# Patient Record
Sex: FEMALE | Race: WHITE | Employment: OTHER | URBAN - METROPOLITAN AREA
[De-identification: names, ages, dates, MRNs, and addresses within clinical notes are randomized per-mention and may not be internally consistent; named-entity substitution may affect disease eponyms.]

---

## 2017-03-17 ENCOUNTER — HOSPITAL ENCOUNTER (OUTPATIENT)
Dept: SLEEP CENTER | Age: 72
Discharge: HOME | End: 2017-03-17
Attending: FAMILY MEDICINE | Admitting: FAMILY MEDICINE

## 2017-04-14 ENCOUNTER — HOSPITAL ENCOUNTER (OUTPATIENT)
Dept: SLEEP CENTER | Age: 72
Discharge: HOME | End: 2017-04-14
Attending: INTERNAL MEDICINE | Admitting: INTERNAL MEDICINE

## 2017-04-28 ENCOUNTER — HOSPITAL ENCOUNTER (OUTPATIENT)
Dept: SLEEP CENTER | Age: 72
Discharge: HOME | End: 2017-04-28
Attending: INTERNAL MEDICINE | Admitting: INTERNAL MEDICINE

## 2017-07-05 LAB
AVERAGE GLUCOSE: NORMAL
HBA1C MFR BLD: 8.9 %

## 2017-07-21 ENCOUNTER — HOSPITAL ENCOUNTER (OUTPATIENT)
Dept: SLEEP CENTER | Age: 72
Discharge: HOME | End: 2017-07-21
Attending: INTERNAL MEDICINE | Admitting: INTERNAL MEDICINE

## 2017-11-22 ENCOUNTER — HOSPITAL ENCOUNTER (OUTPATIENT)
Dept: OTHER | Age: 72
Discharge: HOME | End: 2017-11-22
Attending: INTERNAL MEDICINE | Admitting: INTERNAL MEDICINE

## 2017-11-22 LAB
ALBUMIN SERPL-MCNC: 4.7 GM/DL (ref 3.4–5)
ALP BLD-CCNC: 72 IU/L (ref 40–129)
ALT SERPL-CCNC: 54 U/L (ref 10–40)
ANION GAP SERPL CALCULATED.3IONS-SCNC: 14 MMOL/L (ref 4–16)
AST SERPL-CCNC: 55 IU/L (ref 15–37)
BILIRUB SERPL-MCNC: 0.5 MG/DL (ref 0–1)
BUN BLDV-MCNC: 10 MG/DL (ref 6–23)
CALCIUM SERPL-MCNC: 9.6 MG/DL (ref 8.3–10.6)
CHLORIDE BLD-SCNC: 94 MMOL/L (ref 99–110)
CO2: 28 MMOL/L (ref 21–32)
CREAT SERPL-MCNC: 0.5 MG/DL (ref 0.6–1.1)
FERRITIN: 427 NG/ML (ref 15–150)
GFR AFRICAN AMERICAN: >60 ML/MIN/1.73M2
GFR NON-AFRICAN AMERICAN: >60 ML/MIN/1.73M2
GLUCOSE BLD-MCNC: 243 MG/DL (ref 70–99)
IRON: 52 UG/DL (ref 37–145)
PCT TRANSFERRIN: 15 % (ref 10–44)
POTASSIUM SERPL-SCNC: 3.4 MMOL/L (ref 3.5–5.1)
RETICULOCYTE COUNT PCT: 1.5 % (ref 0.2–2.2)
SODIUM BLD-SCNC: 136 MMOL/L (ref 135–145)
TOTAL IRON BINDING CAPACITY: 357 UG/DL (ref 250–450)
TOTAL PROTEIN: 7.4 GM/DL (ref 6.4–8.2)
UNSATURATED IRON BINDING CAPACITY: 305 UG/DL (ref 110–370)

## 2018-03-23 ENCOUNTER — HOSPITAL ENCOUNTER (OUTPATIENT)
Dept: OTHER | Age: 73
Discharge: HOME | End: 2018-03-23
Attending: INTERNAL MEDICINE | Admitting: INTERNAL MEDICINE

## 2018-03-23 LAB
ALBUMIN SERPL-MCNC: 4.5 GM/DL (ref 3.4–5)
ALP BLD-CCNC: 60 IU/L (ref 40–129)
ALT SERPL-CCNC: 42 U/L (ref 10–40)
ANION GAP SERPL CALCULATED.3IONS-SCNC: 14 MMOL/L (ref 4–16)
APTT: 42.9 SECONDS (ref 21.2–33)
AST SERPL-CCNC: 51 IU/L (ref 15–37)
BILIRUB SERPL-MCNC: 0.3 MG/DL (ref 0–1)
BUN BLDV-MCNC: 13 MG/DL (ref 6–23)
CALCIUM SERPL-MCNC: 10 MG/DL (ref 8.3–10.6)
CHLORIDE BLD-SCNC: 95 MMOL/L (ref 99–110)
CO2: 31 MMOL/L (ref 21–32)
CREAT SERPL-MCNC: 0.5 MG/DL (ref 0.6–1.1)
FERRITIN: 140 NG/ML (ref 15–150)
FOLATE: >20 NG/ML (ref 3.1–17.5)
GFR AFRICAN AMERICAN: >60 ML/MIN/1.73M2
GFR NON-AFRICAN AMERICAN: >60 ML/MIN/1.73M2
GLUCOSE BLD-MCNC: 222 MG/DL (ref 70–99)
INR BLD: 1.19 INDEX
IRON: 53 UG/DL (ref 37–145)
LACTATE DEHYDROGENASE: 162 IU/L (ref 120–246)
PCT TRANSFERRIN: 14 % (ref 10–44)
POTASSIUM SERPL-SCNC: 3.2 MMOL/L (ref 3.5–5.1)
PROTHROMBIN TIME: 13.5 SECONDS (ref 9.12–12.5)
SODIUM BLD-SCNC: 140 MMOL/L (ref 135–145)
TOTAL IRON BINDING CAPACITY: 373 UG/DL (ref 250–450)
TOTAL PROTEIN: 7.1 GM/DL (ref 6.4–8.2)
UNSATURATED IRON BINDING CAPACITY: 320 UG/DL (ref 110–370)
VITAMIN B-12: 563.5 PG/ML (ref 211–911)

## 2018-07-23 ENCOUNTER — HOSPITAL ENCOUNTER (OUTPATIENT)
Dept: OTHER | Age: 73
Discharge: HOME | End: 2018-07-23
Attending: INTERNAL MEDICINE | Admitting: INTERNAL MEDICINE

## 2018-07-23 LAB
ALBUMIN SERPL-MCNC: 4.3 GM/DL (ref 3.4–5)
ALP BLD-CCNC: 73 IU/L (ref 40–129)
ALT SERPL-CCNC: 40 U/L (ref 10–40)
ANION GAP SERPL CALCULATED.3IONS-SCNC: 16 MMOL/L (ref 4–16)
AST SERPL-CCNC: 33 IU/L (ref 15–37)
BILIRUB SERPL-MCNC: 0.7 MG/DL (ref 0–1)
BUN BLDV-MCNC: 10 MG/DL (ref 6–23)
CALCIUM SERPL-MCNC: 9.4 MG/DL (ref 8.3–10.6)
CHLORIDE BLD-SCNC: 100 MMOL/L (ref 99–110)
CO2: 24 MMOL/L (ref 21–32)
CREAT SERPL-MCNC: 0.6 MG/DL (ref 0.6–1.1)
FERRITIN: 87 NG/ML (ref 15–150)
GFR AFRICAN AMERICAN: >60 ML/MIN/1.73M2
GFR NON-AFRICAN AMERICAN: >60 ML/MIN/1.73M2
GLUCOSE BLD-MCNC: 234 MG/DL (ref 70–99)
POTASSIUM SERPL-SCNC: 3.4 MMOL/L (ref 3.5–5.1)
SODIUM BLD-SCNC: 140 MMOL/L (ref 135–145)
TOTAL PROTEIN: 6.9 GM/DL (ref 6.4–8.2)
VITAMIN B-12: 539.6 PG/ML (ref 211–911)

## 2018-10-29 ENCOUNTER — HOSPITAL ENCOUNTER (OUTPATIENT)
Age: 73
Setting detail: SPECIMEN
Discharge: HOME | End: 2018-10-29
Payer: COMMERCIAL

## 2018-10-29 LAB
ALBUMIN SERPL-MCNC: 4.3 GM/DL (ref 3.4–5)
ALP BLD-CCNC: 70 IU/L (ref 40–129)
ALT SERPL-CCNC: 51 U/L (ref 10–40)
ANION GAP SERPL CALCULATED.3IONS-SCNC: 16 MMOL/L (ref 4–16)
APTT: 46.2 SECONDS (ref 21.2–33)
AST SERPL-CCNC: 56 IU/L (ref 15–37)
BILIRUB SERPL-MCNC: 0.4 MG/DL (ref 0–1)
BUN BLDV-MCNC: 9 MG/DL (ref 6–23)
CALCIUM SERPL-MCNC: 10.5 MG/DL (ref 8.3–10.6)
CHLORIDE BLD-SCNC: 96 MMOL/L (ref 99–110)
CO2: 27 MMOL/L (ref 21–32)
CREAT SERPL-MCNC: 0.5 MG/DL (ref 0.6–1.1)
FERRITIN: 68 NG/ML (ref 15–150)
FOLATE: >20 NG/ML (ref 3.1–17.5)
GFR AFRICAN AMERICAN: >60 ML/MIN/1.73M2
GFR NON-AFRICAN AMERICAN: >60 ML/MIN/1.73M2
GLUCOSE BLD-MCNC: 155 MG/DL (ref 70–99)
INR BLD: 1.13 INDEX
IRON: 60 UG/DL (ref 37–145)
LACTATE DEHYDROGENASE: 132 IU/L (ref 120–246)
PCT TRANSFERRIN: 15 % (ref 10–44)
POTASSIUM SERPL-SCNC: 3.6 MMOL/L (ref 3.5–5.1)
PROTHROMBIN TIME: 12.9 SECONDS (ref 9.12–12.5)
RETICULOCYTE COUNT PCT: 1.7 % (ref 0.2–2.2)
SODIUM BLD-SCNC: 139 MMOL/L (ref 135–145)
TOTAL IRON BINDING CAPACITY: 393 UG/DL (ref 250–450)
TOTAL PROTEIN: 7.3 GM/DL (ref 6.4–8.2)
UNSATURATED IRON BINDING CAPACITY: 333 UG/DL (ref 110–370)
VITAMIN B-12: 565 PG/ML (ref 211–911)

## 2018-10-29 PROCEDURE — 83550 IRON BINDING TEST: CPT

## 2018-10-29 PROCEDURE — 83010 ASSAY OF HAPTOGLOBIN QUANT: CPT

## 2018-10-29 PROCEDURE — 82607 VITAMIN B-12: CPT

## 2018-10-29 PROCEDURE — 85045 AUTOMATED RETICULOCYTE COUNT: CPT

## 2018-10-29 PROCEDURE — 83615 LACTATE (LD) (LDH) ENZYME: CPT

## 2018-10-29 PROCEDURE — 80053 COMPREHEN METABOLIC PANEL: CPT

## 2018-10-29 PROCEDURE — 85610 PROTHROMBIN TIME: CPT

## 2018-10-29 PROCEDURE — 85730 THROMBOPLASTIN TIME PARTIAL: CPT

## 2018-10-29 PROCEDURE — 82728 ASSAY OF FERRITIN: CPT

## 2018-10-29 PROCEDURE — 83540 ASSAY OF IRON: CPT

## 2018-10-29 PROCEDURE — 82746 ASSAY OF FOLIC ACID SERUM: CPT

## 2018-10-29 PROCEDURE — 86038 ANTINUCLEAR ANTIBODIES: CPT

## 2018-10-29 PROCEDURE — 86430 RHEUMATOID FACTOR TEST QUAL: CPT

## 2018-10-30 LAB
HAPTOGLOBIN: 149
RHEUMATOID FACTOR: 10

## 2018-10-31 ENCOUNTER — HOSPITAL ENCOUNTER (OUTPATIENT)
Age: 73
Setting detail: SPECIMEN
Discharge: HOME | End: 2018-10-31
Payer: COMMERCIAL

## 2018-10-31 LAB
ANTI-NUCLEAR ANTIBODY (ANA): NORMAL
APTT: 35.3 SECONDS (ref 21.2–33)
INR BLD: 1.08 INDEX
PROTHROMBIN TIME: 12.3 SECONDS (ref 9.12–12.5)

## 2018-10-31 PROCEDURE — 85730 THROMBOPLASTIN TIME PARTIAL: CPT

## 2018-10-31 PROCEDURE — 85610 PROTHROMBIN TIME: CPT

## 2018-10-31 PROCEDURE — 85732 THROMBOPLASTIN TIME PARTIAL: CPT

## 2018-10-31 PROCEDURE — 85246 CLOT FACTOR VIII VW ANTIGEN: CPT

## 2018-10-31 PROCEDURE — 85247 CLOT FACTOR VIII MULTIMETRIC: CPT

## 2018-11-03 LAB
PTT 1-HOUR, 1:1 MIX: 42
PTT 1-HOUR: 55
PTT 1:1 MIX: 46
PTT INHIBITOR SCREEN: 62
VON WILLEBRAND AG: 211 % (ref 50–150)

## 2018-11-05 LAB — VON WILLEBRAND MULTIMERS: NORMAL

## 2018-11-13 ENCOUNTER — HOSPITAL ENCOUNTER (OUTPATIENT)
Dept: CT IMAGING | Age: 73
Discharge: HOME | End: 2018-11-13
Payer: COMMERCIAL

## 2018-11-13 DIAGNOSIS — R63.4 WEIGHT LOSS: ICD-10-CM

## 2018-11-13 PROCEDURE — 2580000003 HC RX 258: Performed by: INTERNAL MEDICINE

## 2018-11-13 PROCEDURE — 71260 CT THORAX DX C+: CPT

## 2018-11-13 PROCEDURE — 6360000004 HC RX CONTRAST MEDICATION: Performed by: INTERNAL MEDICINE

## 2018-11-13 PROCEDURE — 74177 CT ABD & PELVIS W/CONTRAST: CPT

## 2018-11-13 RX ORDER — 0.9 % SODIUM CHLORIDE 0.9 %
10 VIAL (ML) INJECTION
Status: COMPLETED | OUTPATIENT
Start: 2018-11-13 | End: 2018-11-13

## 2018-11-13 RX ADMIN — IOPAMIDOL 75 ML: 755 INJECTION, SOLUTION INTRAVENOUS at 11:36

## 2018-11-13 RX ADMIN — SODIUM CHLORIDE, PRESERVATIVE FREE 10 ML: 5 INJECTION INTRAVENOUS at 11:36

## 2018-11-13 RX ADMIN — IOHEXOL 50 ML: 240 INJECTION, SOLUTION INTRATHECAL; INTRAVASCULAR; INTRAVENOUS; ORAL at 11:36

## 2019-04-23 ENCOUNTER — HOSPITAL ENCOUNTER (OUTPATIENT)
Age: 74
Setting detail: SPECIMEN
Discharge: HOME | End: 2019-04-23
Payer: COMMERCIAL

## 2019-04-23 LAB
ALBUMIN SERPL-MCNC: 4.2 GM/DL (ref 3.4–5)
ALP BLD-CCNC: 72 IU/L (ref 40–128)
ALT SERPL-CCNC: 40 U/L (ref 10–40)
ANION GAP SERPL CALCULATED.3IONS-SCNC: 14 MMOL/L (ref 4–16)
AST SERPL-CCNC: 60 IU/L (ref 15–37)
BILIRUB SERPL-MCNC: 0.4 MG/DL (ref 0–1)
BUN BLDV-MCNC: 11 MG/DL (ref 6–23)
CALCIUM SERPL-MCNC: 9.7 MG/DL (ref 8.3–10.6)
CHLORIDE BLD-SCNC: 98 MMOL/L (ref 99–110)
CO2: 26 MMOL/L (ref 21–32)
CREAT SERPL-MCNC: 0.5 MG/DL (ref 0.6–1.1)
FERRITIN: 33 NG/ML (ref 15–150)
FOLATE: >20 NG/ML (ref 3.1–17.5)
GFR AFRICAN AMERICAN: >60 ML/MIN/1.73M2
GFR NON-AFRICAN AMERICAN: >60 ML/MIN/1.73M2
GLUCOSE BLD-MCNC: 258 MG/DL (ref 70–99)
IRON: 53 UG/DL (ref 37–145)
PCT TRANSFERRIN: 12 % (ref 10–44)
POTASSIUM SERPL-SCNC: 3.4 MMOL/L (ref 3.5–5.1)
SODIUM BLD-SCNC: 138 MMOL/L (ref 135–145)
TOTAL IRON BINDING CAPACITY: 434 UG/DL (ref 250–450)
TOTAL PROTEIN: 6.7 GM/DL (ref 6.4–8.2)
UNSATURATED IRON BINDING CAPACITY: 381 UG/DL (ref 110–370)
VITAMIN B-12: 623 PG/ML (ref 211–911)

## 2019-04-23 PROCEDURE — 82746 ASSAY OF FOLIC ACID SERUM: CPT

## 2019-04-23 PROCEDURE — 83550 IRON BINDING TEST: CPT

## 2019-04-23 PROCEDURE — 80053 COMPREHEN METABOLIC PANEL: CPT

## 2019-04-23 PROCEDURE — 83540 ASSAY OF IRON: CPT

## 2019-04-23 PROCEDURE — 82607 VITAMIN B-12: CPT

## 2019-04-23 PROCEDURE — 82728 ASSAY OF FERRITIN: CPT

## 2019-08-22 ENCOUNTER — HOSPITAL ENCOUNTER (OUTPATIENT)
Age: 74
Setting detail: SPECIMEN
Discharge: HOME | End: 2019-08-22
Payer: COMMERCIAL

## 2019-08-22 LAB
ALBUMIN SERPL-MCNC: 4.5 GM/DL (ref 3.4–5)
ALP BLD-CCNC: 61 IU/L (ref 40–129)
ALT SERPL-CCNC: 27 U/L (ref 10–40)
ANION GAP SERPL CALCULATED.3IONS-SCNC: 15 MMOL/L (ref 4–16)
AST SERPL-CCNC: 43 IU/L (ref 15–37)
BILIRUB SERPL-MCNC: 0.3 MG/DL (ref 0–1)
BUN BLDV-MCNC: 12 MG/DL (ref 6–23)
CALCIUM SERPL-MCNC: 10.3 MG/DL (ref 8.3–10.6)
CHLORIDE BLD-SCNC: 97 MMOL/L (ref 99–110)
CO2: 25 MMOL/L (ref 21–32)
CREAT SERPL-MCNC: 0.5 MG/DL (ref 0.6–1.1)
FERRITIN: 25 NG/ML (ref 15–150)
FOLATE: >20 NG/ML (ref 3.1–17.5)
GFR AFRICAN AMERICAN: >60 ML/MIN/1.73M2
GFR NON-AFRICAN AMERICAN: >60 ML/MIN/1.73M2
GLUCOSE BLD-MCNC: 179 MG/DL (ref 70–99)
IRON: 37 UG/DL (ref 37–145)
PCT TRANSFERRIN: 8 % (ref 10–44)
POTASSIUM SERPL-SCNC: 3.6 MMOL/L (ref 3.5–5.1)
SODIUM BLD-SCNC: 137 MMOL/L (ref 135–145)
TOTAL IRON BINDING CAPACITY: 443 UG/DL (ref 250–450)
TOTAL PROTEIN: 7.2 GM/DL (ref 6.4–8.2)
UNSATURATED IRON BINDING CAPACITY: 406 UG/DL (ref 110–370)
VITAMIN B-12: 514 PG/ML (ref 211–911)

## 2019-08-22 PROCEDURE — 82728 ASSAY OF FERRITIN: CPT

## 2019-08-22 PROCEDURE — 83540 ASSAY OF IRON: CPT

## 2019-08-22 PROCEDURE — 83550 IRON BINDING TEST: CPT

## 2019-08-22 PROCEDURE — 80053 COMPREHEN METABOLIC PANEL: CPT

## 2019-08-22 PROCEDURE — 82746 ASSAY OF FOLIC ACID SERUM: CPT

## 2019-08-22 PROCEDURE — 82607 VITAMIN B-12: CPT

## 2020-02-17 ENCOUNTER — HOSPITAL ENCOUNTER (OUTPATIENT)
Dept: INFUSION THERAPY | Age: 75
Discharge: HOME | End: 2020-02-17
Payer: MEDICARE

## 2020-02-17 LAB
ALBUMIN SERPL-MCNC: 4.3 GM/DL (ref 3.4–5)
ALP BLD-CCNC: 62 IU/L (ref 40–129)
ALT SERPL-CCNC: 27 U/L (ref 10–40)
ANION GAP SERPL CALCULATED.3IONS-SCNC: 15 MMOL/L (ref 4–16)
AST SERPL-CCNC: 39 IU/L (ref 15–37)
BASOPHILS ABSOLUTE: 0.1 K/CU MM
BASOPHILS RELATIVE PERCENT: 1 % (ref 0–1)
BILIRUB SERPL-MCNC: 0.3 MG/DL (ref 0–1)
BUN BLDV-MCNC: 16 MG/DL (ref 6–23)
CALCIUM SERPL-MCNC: 10.1 MG/DL (ref 8.3–10.6)
CHLORIDE BLD-SCNC: 98 MMOL/L (ref 99–110)
CO2: 27 MMOL/L (ref 21–32)
CREAT SERPL-MCNC: 0.7 MG/DL (ref 0.6–1.1)
DIFFERENTIAL TYPE: ABNORMAL
EOSINOPHILS ABSOLUTE: 0.5 K/CU MM
EOSINOPHILS RELATIVE PERCENT: 5 % (ref 0–3)
FERRITIN: 283 NG/ML (ref 15–150)
FOLATE: >20 NG/ML (ref 3.1–17.5)
GFR AFRICAN AMERICAN: >60 ML/MIN/1.73M2
GFR NON-AFRICAN AMERICAN: >60 ML/MIN/1.73M2
GLUCOSE BLD-MCNC: 140 MG/DL (ref 70–99)
HCT VFR BLD CALC: 40.3 % (ref 37–47)
HEMOGLOBIN: 13.6 GM/DL (ref 12.5–16)
IRON: 71 UG/DL (ref 37–145)
LYMPHOCYTES ABSOLUTE: 2.7 K/CU MM
LYMPHOCYTES RELATIVE PERCENT: 28 % (ref 24–44)
MCH RBC QN AUTO: 31.6 PG (ref 27–31)
MCHC RBC AUTO-ENTMCNC: 33.7 % (ref 32–36)
MCV RBC AUTO: 93.5 FL (ref 78–100)
MONOCYTES ABSOLUTE: 1 K/CU MM
MONOCYTES RELATIVE PERCENT: 10 % (ref 0–4)
PCT TRANSFERRIN: 20 % (ref 10–44)
PDW BLD-RTO: 13.1 % (ref 11.7–14.9)
PLATELET # BLD: 148 K/CU MM (ref 140–440)
PMV BLD AUTO: 11.3 FL (ref 7.5–11.1)
POTASSIUM SERPL-SCNC: 3.6 MMOL/L (ref 3.5–5.1)
RBC # BLD: 4.31 M/CU MM (ref 4.2–5.4)
SEGMENTED NEUTROPHILS ABSOLUTE COUNT: 5.3 K/CU MM
SEGMENTED NEUTROPHILS RELATIVE PERCENT: 56 % (ref 36–66)
SODIUM BLD-SCNC: 140 MMOL/L (ref 135–145)
TOTAL IRON BINDING CAPACITY: 356 UG/DL (ref 250–450)
TOTAL PROTEIN: 7.2 GM/DL (ref 6.4–8.2)
UNSATURATED IRON BINDING CAPACITY: 285 UG/DL (ref 110–370)
VITAMIN B-12: 688.4 PG/ML (ref 211–911)
WBC # BLD: 9.6 K/CU MM (ref 4–10.5)

## 2020-02-17 PROCEDURE — 85025 COMPLETE CBC W/AUTO DIFF WBC: CPT

## 2020-02-17 PROCEDURE — 82746 ASSAY OF FOLIC ACID SERUM: CPT

## 2020-02-17 PROCEDURE — 82607 VITAMIN B-12: CPT

## 2020-02-17 PROCEDURE — 36415 COLL VENOUS BLD VENIPUNCTURE: CPT

## 2020-02-17 PROCEDURE — 82728 ASSAY OF FERRITIN: CPT

## 2020-02-17 PROCEDURE — 83550 IRON BINDING TEST: CPT

## 2020-02-17 PROCEDURE — 80053 COMPREHEN METABOLIC PANEL: CPT

## 2020-02-17 PROCEDURE — 83540 ASSAY OF IRON: CPT

## 2020-08-13 DIAGNOSIS — D69.6 THROMBOCYTOPENIA, UNSPECIFIED (HCC): ICD-10-CM

## 2020-08-13 DIAGNOSIS — R79.1 ABNORMAL COAGULATION PROFILE: ICD-10-CM

## 2020-08-13 DIAGNOSIS — D50.8 OTHER IRON DEFICIENCY ANEMIAS: ICD-10-CM

## 2020-08-13 PROBLEM — K90.9 INTESTINAL MALABSORPTION, UNSPECIFIED: Status: ACTIVE | Noted: 2020-08-13

## 2020-08-13 PROBLEM — R10.811 RIGHT UPPER QUADRANT ABDOMINAL TENDERNESS: Status: ACTIVE | Noted: 2020-08-13

## 2020-08-13 PROBLEM — K74.60 UNSPECIFIED CIRRHOSIS OF LIVER (HCC): Status: ACTIVE | Noted: 2020-08-13

## 2020-10-30 ENCOUNTER — OFFICE VISIT (OUTPATIENT)
Dept: ONCOLOGY | Age: 75
End: 2020-10-30
Payer: MEDICARE

## 2020-10-30 ENCOUNTER — HOSPITAL ENCOUNTER (OUTPATIENT)
Dept: INFUSION THERAPY | Age: 75
Discharge: HOME | End: 2020-10-30
Payer: MEDICARE

## 2020-10-30 VITALS
RESPIRATION RATE: 16 BRPM | OXYGEN SATURATION: 98 % | BODY MASS INDEX: 27.42 KG/M2 | WEIGHT: 149 LBS | TEMPERATURE: 98.2 F | HEART RATE: 82 BPM | HEIGHT: 62 IN | SYSTOLIC BLOOD PRESSURE: 143 MMHG | DIASTOLIC BLOOD PRESSURE: 76 MMHG

## 2020-10-30 DIAGNOSIS — D50.8 OTHER IRON DEFICIENCY ANEMIAS: ICD-10-CM

## 2020-10-30 LAB
ALBUMIN SERPL-MCNC: 4.3 GM/DL (ref 3.4–5)
ALP BLD-CCNC: 53 IU/L (ref 40–129)
ALT SERPL-CCNC: 21 U/L (ref 10–40)
ANION GAP SERPL CALCULATED.3IONS-SCNC: 16 MMOL/L (ref 4–16)
AST SERPL-CCNC: 34 IU/L (ref 15–37)
BASOPHILS ABSOLUTE: 0.1 K/CU MM
BASOPHILS RELATIVE PERCENT: 0.6 % (ref 0–1)
BILIRUB SERPL-MCNC: 0.3 MG/DL (ref 0–1)
BUN BLDV-MCNC: 12 MG/DL (ref 6–23)
CALCIUM SERPL-MCNC: 10.3 MG/DL (ref 8.3–10.6)
CHLORIDE BLD-SCNC: 96 MMOL/L (ref 99–110)
CO2: 25 MMOL/L (ref 21–32)
CREAT SERPL-MCNC: 0.6 MG/DL (ref 0.6–1.1)
DIFFERENTIAL TYPE: ABNORMAL
EOSINOPHILS ABSOLUTE: 0.3 K/CU MM
EOSINOPHILS RELATIVE PERCENT: 3.4 % (ref 0–3)
GFR AFRICAN AMERICAN: >60 ML/MIN/1.73M2
GFR NON-AFRICAN AMERICAN: >60 ML/MIN/1.73M2
GLUCOSE BLD-MCNC: 192 MG/DL (ref 70–99)
HCT VFR BLD CALC: 41 % (ref 37–47)
HEMOGLOBIN: 13.8 GM/DL (ref 12.5–16)
IRON: 47 UG/DL (ref 37–145)
LYMPHOCYTES ABSOLUTE: 2 K/CU MM
LYMPHOCYTES RELATIVE PERCENT: 22.9 % (ref 24–44)
MCH RBC QN AUTO: 30.6 PG (ref 27–31)
MCHC RBC AUTO-ENTMCNC: 33.7 % (ref 32–36)
MCV RBC AUTO: 90.9 FL (ref 78–100)
MONOCYTES ABSOLUTE: 1 K/CU MM
MONOCYTES RELATIVE PERCENT: 11.7 % (ref 0–4)
PCT TRANSFERRIN: 13 % (ref 10–44)
PDW BLD-RTO: 13.8 % (ref 11.7–14.9)
PLATELET # BLD: 148 K/CU MM (ref 140–440)
PMV BLD AUTO: 11.1 FL (ref 7.5–11.1)
POTASSIUM SERPL-SCNC: 3.6 MMOL/L (ref 3.5–5.1)
RBC # BLD: 4.51 M/CU MM (ref 4.2–5.4)
SEGMENTED NEUTROPHILS ABSOLUTE COUNT: 5.3 K/CU MM
SEGMENTED NEUTROPHILS RELATIVE PERCENT: 61.4 % (ref 36–66)
SODIUM BLD-SCNC: 137 MMOL/L (ref 135–145)
TOTAL IRON BINDING CAPACITY: 374 UG/DL (ref 250–450)
TOTAL PROTEIN: 7.2 GM/DL (ref 6.4–8.2)
UNSATURATED IRON BINDING CAPACITY: 327 UG/DL (ref 110–370)
WBC # BLD: 8.6 K/CU MM (ref 4–10.5)

## 2020-10-30 PROCEDURE — 1090F PRES/ABSN URINE INCON ASSESS: CPT | Performed by: INTERNAL MEDICINE

## 2020-10-30 PROCEDURE — G8400 PT W/DXA NO RESULTS DOC: HCPCS | Performed by: INTERNAL MEDICINE

## 2020-10-30 PROCEDURE — G8427 DOCREV CUR MEDS BY ELIG CLIN: HCPCS | Performed by: INTERNAL MEDICINE

## 2020-10-30 PROCEDURE — 83540 ASSAY OF IRON: CPT

## 2020-10-30 PROCEDURE — 4040F PNEUMOC VAC/ADMIN/RCVD: CPT | Performed by: INTERNAL MEDICINE

## 2020-10-30 PROCEDURE — 83550 IRON BINDING TEST: CPT

## 2020-10-30 PROCEDURE — G8417 CALC BMI ABV UP PARAM F/U: HCPCS | Performed by: INTERNAL MEDICINE

## 2020-10-30 PROCEDURE — G8484 FLU IMMUNIZE NO ADMIN: HCPCS | Performed by: INTERNAL MEDICINE

## 2020-10-30 PROCEDURE — 82607 VITAMIN B-12: CPT

## 2020-10-30 PROCEDURE — 82728 ASSAY OF FERRITIN: CPT

## 2020-10-30 PROCEDURE — 1036F TOBACCO NON-USER: CPT | Performed by: INTERNAL MEDICINE

## 2020-10-30 PROCEDURE — 99214 OFFICE O/P EST MOD 30 MIN: CPT | Performed by: INTERNAL MEDICINE

## 2020-10-30 PROCEDURE — 3017F COLORECTAL CA SCREEN DOC REV: CPT | Performed by: INTERNAL MEDICINE

## 2020-10-30 PROCEDURE — 99211 OFF/OP EST MAY X REQ PHY/QHP: CPT

## 2020-10-30 PROCEDURE — 85025 COMPLETE CBC W/AUTO DIFF WBC: CPT

## 2020-10-30 PROCEDURE — 36415 COLL VENOUS BLD VENIPUNCTURE: CPT

## 2020-10-30 PROCEDURE — 82746 ASSAY OF FOLIC ACID SERUM: CPT

## 2020-10-30 PROCEDURE — 80053 COMPREHEN METABOLIC PANEL: CPT

## 2020-10-30 PROCEDURE — 1123F ACP DISCUSS/DSCN MKR DOCD: CPT | Performed by: INTERNAL MEDICINE

## 2020-10-30 ASSESSMENT — PATIENT HEALTH QUESTIONNAIRE - PHQ9
SUM OF ALL RESPONSES TO PHQ9 QUESTIONS 1 & 2: 0
SUM OF ALL RESPONSES TO PHQ QUESTIONS 1-9: 0
SUM OF ALL RESPONSES TO PHQ QUESTIONS 1-9: 0
2. FEELING DOWN, DEPRESSED OR HOPELESS: 0
1. LITTLE INTEREST OR PLEASURE IN DOING THINGS: 0
SUM OF ALL RESPONSES TO PHQ QUESTIONS 1-9: 0

## 2020-10-30 NOTE — PROGRESS NOTES
MA Rooming Questions  Patient: Mathieu Orlando  MRN: [id number]    Date: 10/30/2020        1. Do you have any new issues?   no         2. Do you need any refills on medications?    no    3. Have you had any imaging done since your last visit?   no    4. Have you been hospitalized or seen in the emergency room since your last visit here?   no    5. Did the patient have a depression screening completed today?  Yes    PHQ-9 Total Score: 0 (10/30/2020  2:31 PM)       PHQ-9 Given to (if applicable):               PHQ-9 Score (if applicable):                     [] Positive     []  Negative              Does question #9 need addressed (if applicable)                     [] Yes    []  No               Starla Dan MA

## 2020-10-30 NOTE — PROGRESS NOTES
Patient Name: Leslie Godoy  Patient : 1945  Patient MRN: [id number]     Primary Oncologist: Dena Rivero MD  Referring Physician: Mandy Baez MD   GI: Dr Maruqez De Los Santos       Date of Service: 10/30/2020      Chief Complaint:   Chief Complaint   Patient presents with    Discuss Labs        Active Problem list  1. Other iron deficiency anemias    2. Intestinal malabsorption, unspecified type    3. Abnormal coagulation profile    4. Thrombocytopenia, unspecified ([facility] [address]           HPI:        Initial HPI: 2017:  81 YOWF arrived alone. Reported increased fatigue for the past year. Reported that she falls asleep easily even at work. Friday is the last day of work. Denied any overt bleeding. Denied any chest pain, sob. Denied any PICA sx. Reported RUQ yesterday all day, was told that she had /gall bladder disease. Reported that she has been placed on oral iron supplements may 2017 but reports diarrhea associated with oral iron. Denied any fever, drenching night sweats or any weight loss. Reported dark stools when she takes tab. BP at home 190/80. Denies any Gu sx  ferritin 13, sats 10%    2017:Received parental iron and placed on oral iron supplementation. 17:Today in the clinic she arrived alone  -she reports that she feels more energetic after the iron infusion   -reports that she continues to have rt sided abdominal pain  -reports weight loss of about 4 lbs in the past month or so  -denies any overt bleeding  -denies any chest pain, sob  -reports uri sx  -denies any fever    3/22/18:Arrived alone to the clinic. Reported that her energy levels are much better after the iron infusion. Was sick with bronchitis  and 2018. Denied any fever, night sweats. Reported 24 lb weight loss as she walking more and also watching her diet. Has gained 4 lbs. Reported nose bleed when she had the sinus infection but no other overt bleeding. Reported eczematous rash.  Denied any chest pain, increased sob, cough, palpitation. Reported that she continues to have RUQ pain, has follow up appt with GI.     C-scope may 2015 with internal hemmorrhoids and diverticulosis    C scope 4/30/2018 with tubular adenoma of the sigmoid colon tubular adenoma of the transverse colon and diverticulosis of the sigmoid colon and descending colon. Hemorrhoids grade 2. EGD 6/25/2018 with hiatal hernia, dilated gastric stenosis, nonbleeding gastric ulcer, chronic gastritis. 11/13/18: Ct chest, abdomen and pelvis:IMPRESSION: No findings suspicious for malignancy within the chest, abdomen or pelvis. Hepatosplenomegaly with nodular liver contour and hepatic steatosis. Additional features suggesting some degree of portal venous hypertension. No abdominal ascites. 9/2019: Received parental iron    9/2019 CT scan of the chest showed small mediastinal lymph nodes. No lung nodules. Old granulomatous disease    2/17/2020 iron saturation of 20% folate of more than 20 ferritin 283 CMP with the BUN of 16 creatinine 0.7 albumin of 4.3 B12 688 CBC with WBC of 9.6 hemoglobin of 13.6 hematocrit 40.3 MCV of 93.5 and platelets 909    PMH:  DM  HTN  HLP  CAD  Polymyalgia rheumatica    PSH:   Hysterectomy  Foot surgeries    Allergies:  NKDA    SH:   Lives with son  No etoh/tob or any other illicit drug abuse    FH: NC    Interval History  10/30/2020: arrived alone to the clinic today. Overall feels good. No chest pain, increased sob, palpitations or any dizziness. Appetite is ok but lost about 24 lbs in the past 11m. Lost job last November and has not jacoby snaking as much as she used to at work and not fixing big meals. Was depressed and sleeping a lot. And not eating out as much. No dysphagia or any odynophagia. No abdominal pain, nausea, emesis, diarrhea or any constipation. No  sx. No LE edema. No HA, vision changes, focal weakness. Reported vaginal dryness.     Review of Systems   Per interval history; otherwise 10 point ROS is negative              Vital Signs:  BP (!) 143/76 (Site: Right Upper Arm, Position: Sitting, Cuff Size: Large Adult)   Pulse 82   Temp 98.2 °F (36.8 °C) (Infrared)   Resp 16   Ht 5' 2\" (1.575 m)   Wt 149 lb (67.6 kg)   SpO2 98%   BMI 27.25 kg/m²     Physical Exam:    Constitutional: Aao x 3. Head/Face/Neck:ATNC, rt sterno clavicular prominence  Neck-Lymphatic: No lad Eyes: No icterus. No pallor. Mouth-Throat: mouth dry  Cardiovascular: S1S2, rrr, nsr, SM+.  Respiratory: bilateral wheeze GI Exam: Soft nd nt bs pos Extremities: Bilateral LE edema left >right Neurology: GI.      Labs:    Hematology:  Lab Results   Component Value Date    WBC 8.6 10/30/2020    RBC 4.51 10/30/2020    HGB 13.8 10/30/2020    HCT 41.0 10/30/2020    MCV 90.9 10/30/2020    MCH 30.6 10/30/2020    MCHC 33.7 10/30/2020    RDW 13.8 10/30/2020     10/30/2020    MPV 11.1 10/30/2020    BANDSPCT 12 (H) 11/08/2014    SEGSPCT 61.4 10/30/2020    EOSRELPCT 3.4 (H) 10/30/2020    BASOPCT 0.6 10/30/2020    LYMPHOPCT 22.9 (L) 10/30/2020    MONOPCT 11.7 (H) 10/30/2020    BANDABS 2.27 11/08/2014    SEGSABS 5.3 10/30/2020    EOSABS 0.3 10/30/2020    BASOSABS 0.1 10/30/2020    LYMPHSABS 2.0 10/30/2020    MONOSABS 1.0 10/30/2020    DIFFTYPE AUTOMATED DIFFERENTIAL 10/30/2020    POLYCHROM 1+ 11/08/2014    WBCMORP RARE 11/08/2014    PLTM SEVERAL LARGE PLATELETS 10/55/6476     No results found for: ESR    Chemistry:  Lab Results   Component Value Date     02/17/2020    K 3.6 02/17/2020    CL 98 (L) 02/17/2020    CO2 27 02/17/2020    BUN 16 02/17/2020    CREATININE 0.7 02/17/2020    GLUCOSE 140 (H) 02/17/2020    CALCIUM 10.1 02/17/2020    PROT 7.2 02/17/2020    LABALBU 4.3 02/17/2020    BILITOT 0.3 02/17/2020    ALKPHOS 62 02/17/2020    AST 39 (H) 02/17/2020    ALT 27 02/17/2020    LABGLOM >60 02/17/2020    GFRAA >60 02/17/2020    PHOS 3.3 04/08/2016    MG 1.7 (L) 11/17/2014    POCGLU 141 (H) 04/20/2016     Lab Results   Component Value Date    LDH 132 10/29/2018     No components found for: LD  No results found for: TSHHS, T4FREE, FT3    Immunology:  Lab Results   Component Value Date    PROT 7.2 02/17/2020     No results found for: Deana Brood, KLFLCR  No results found for: B2M    Coagulation Panel:  Lab Results   Component Value Date    PROTIME 12.3 10/31/2018    INR 1.08 10/31/2018    APTT 35.3 (H) 10/31/2018    DDIMER >5250 (H) 10/29/2014       Anemia Panel:  Lab Results   Component Value Date    ATLUZEVF77 688.4 02/17/2020    FOLATE >20.0 (H) 02/17/2020       Tumor Markers:  No results found for: , CEA, , LABCA2, PSA      Imaging: Reviewed     Pathology:Reviewed     Observations:  Performance Status: ECOG 0  Depression Status: PHQ-9 Total Score: 0 (10/30/2020  2:31 PM)          Assessment & Plan:  Elderly female with h/o PMR on prednisone with megha    Anemia: iron indices were suggestive of MEGHA. Received parental iron. No hemolysis, monoclonal gammopathy, other nutritional def. UA neg for blood. Anemia also probably contributed by aoe/aocd/ulcer, variceal bleed. Follows with GI. Oral iron pending ferritin    Mild thrombocytopenia: Reviewed meds. No evidence of hemolysis, nutritional def, ctd, RF. Most probably sec to splenic sequestration. Platelet counts stable    cirrhosis: is being followed by GI    Coagulopathy:sec to liver disease, Studies otherwise inconclusive    weight loss again : Pan CT neg for malignancy in the past. Main loss was between November 2019 and June 2020. But gained few lbs after June 2020. Defers any evaluation at this point    RUQ pain:Chronic, intermittent. Frequency less and intensity less as well. ?sec to steatohepatitis vs Gastritis. Improved with weight loss. Follows with GI. HTN: Dr Ulysses Connolly following. Vaginal dryness: recommend Gyn evaluation, may have pelvic exam as well. Discussed the findings and the plan.  Pt verbalizes understanding    Discussed healthy lifestyle including regular exercise and weight loss. Also discussed the importance of being up-to-date with age-appropriate screening tools. Mammogram due this year 2020. Colonoscopy due 2022 per her. Recommend follow up with PCP and other specialists    Please do not hesitate to contact us if you need any further information.     RTC April 2021 or earlier if new sx    BRENNAN           Electronically signed by Fortunato Hoff MD on 10/30/2020 at 2:35 PM

## 2020-11-01 LAB
FERRITIN: 166 NG/ML (ref 15–150)
FOLATE: 16.6 NG/ML (ref 3.1–17.5)
VITAMIN B-12: 472.8 PG/ML (ref 211–911)

## 2021-04-28 ENCOUNTER — HOSPITAL ENCOUNTER (OUTPATIENT)
Dept: INFUSION THERAPY | Age: 76
Discharge: HOME | End: 2021-04-28
Payer: MEDICARE

## 2021-04-28 ENCOUNTER — OFFICE VISIT (OUTPATIENT)
Dept: ONCOLOGY | Age: 76
End: 2021-04-28
Payer: MEDICARE

## 2021-04-28 VITALS
RESPIRATION RATE: 16 BRPM | TEMPERATURE: 97.3 F | DIASTOLIC BLOOD PRESSURE: 81 MMHG | WEIGHT: 156.2 LBS | HEIGHT: 62 IN | OXYGEN SATURATION: 94 % | SYSTOLIC BLOOD PRESSURE: 166 MMHG | HEART RATE: 81 BPM | BODY MASS INDEX: 28.74 KG/M2

## 2021-04-28 DIAGNOSIS — D69.6 THROMBOCYTOPENIA, UNSPECIFIED (HCC): Primary | ICD-10-CM

## 2021-04-28 DIAGNOSIS — Z12.31 OTHER SCREENING MAMMOGRAM: ICD-10-CM

## 2021-04-28 DIAGNOSIS — D50.8 OTHER IRON DEFICIENCY ANEMIAS: ICD-10-CM

## 2021-04-28 DIAGNOSIS — D69.6 THROMBOCYTOPENIA, UNSPECIFIED (HCC): ICD-10-CM

## 2021-04-28 DIAGNOSIS — K90.9 INTESTINAL MALABSORPTION, UNSPECIFIED TYPE: ICD-10-CM

## 2021-04-28 DIAGNOSIS — K74.60 CIRRHOSIS OF LIVER WITHOUT ASCITES, UNSPECIFIED HEPATIC CIRRHOSIS TYPE (HCC): ICD-10-CM

## 2021-04-28 DIAGNOSIS — R79.1 ABNORMAL COAGULATION PROFILE: ICD-10-CM

## 2021-04-28 LAB
ALBUMIN SERPL-MCNC: 4.5 GM/DL (ref 3.4–5)
ALP BLD-CCNC: 47 IU/L (ref 40–129)
ALT SERPL-CCNC: 26 U/L (ref 10–40)
ANION GAP SERPL CALCULATED.3IONS-SCNC: 11 MMOL/L (ref 4–16)
AST SERPL-CCNC: 34 IU/L (ref 15–37)
BASOPHILS ABSOLUTE: 0.1 K/CU MM
BASOPHILS RELATIVE PERCENT: 0.6 % (ref 0–1)
BILIRUB SERPL-MCNC: 0.3 MG/DL (ref 0–1)
BUN BLDV-MCNC: 16 MG/DL (ref 6–23)
CALCIUM SERPL-MCNC: 10.6 MG/DL (ref 8.3–10.6)
CHLORIDE BLD-SCNC: 98 MMOL/L (ref 99–110)
CO2: 27 MMOL/L (ref 21–32)
CREAT SERPL-MCNC: 0.6 MG/DL (ref 0.6–1.1)
DIFFERENTIAL TYPE: ABNORMAL
EOSINOPHILS ABSOLUTE: 0.3 K/CU MM
EOSINOPHILS RELATIVE PERCENT: 3.8 % (ref 0–3)
FERRITIN: 92 NG/ML (ref 15–150)
GFR AFRICAN AMERICAN: >60 ML/MIN/1.73M2
GFR NON-AFRICAN AMERICAN: >60 ML/MIN/1.73M2
GLUCOSE BLD-MCNC: 297 MG/DL (ref 70–99)
HCT VFR BLD CALC: 38.6 % (ref 37–47)
HEMOGLOBIN: 13 GM/DL (ref 12.5–16)
IRON: 62 UG/DL (ref 37–145)
LYMPHOCYTES ABSOLUTE: 1.8 K/CU MM
LYMPHOCYTES RELATIVE PERCENT: 21.5 % (ref 24–44)
MCH RBC QN AUTO: 30.5 PG (ref 27–31)
MCHC RBC AUTO-ENTMCNC: 33.7 % (ref 32–36)
MCV RBC AUTO: 90.6 FL (ref 78–100)
MONOCYTES ABSOLUTE: 1 K/CU MM
MONOCYTES RELATIVE PERCENT: 11.5 % (ref 0–4)
PCT TRANSFERRIN: 16 % (ref 10–44)
PDW BLD-RTO: 14.1 % (ref 11.7–14.9)
PLATELET # BLD: 138 K/CU MM (ref 140–440)
PMV BLD AUTO: 11 FL (ref 7.5–11.1)
POTASSIUM SERPL-SCNC: 3.7 MMOL/L (ref 3.5–5.1)
RBC # BLD: 4.26 M/CU MM (ref 4.2–5.4)
RETICULOCYTE COUNT PCT: 2.2 % (ref 0.2–2.2)
SEGMENTED NEUTROPHILS ABSOLUTE COUNT: 5.3 K/CU MM
SEGMENTED NEUTROPHILS RELATIVE PERCENT: 62.6 % (ref 36–66)
SODIUM BLD-SCNC: 136 MMOL/L (ref 135–145)
TOTAL IRON BINDING CAPACITY: 383 UG/DL (ref 250–450)
TOTAL PROTEIN: 7.1 GM/DL (ref 6.4–8.2)
UNSATURATED IRON BINDING CAPACITY: 321 UG/DL (ref 110–370)
WBC # BLD: 8.4 K/CU MM (ref 4–10.5)

## 2021-04-28 PROCEDURE — 1036F TOBACCO NON-USER: CPT | Performed by: INTERNAL MEDICINE

## 2021-04-28 PROCEDURE — 85045 AUTOMATED RETICULOCYTE COUNT: CPT

## 2021-04-28 PROCEDURE — 99214 OFFICE O/P EST MOD 30 MIN: CPT | Performed by: INTERNAL MEDICINE

## 2021-04-28 PROCEDURE — 3017F COLORECTAL CA SCREEN DOC REV: CPT | Performed by: INTERNAL MEDICINE

## 2021-04-28 PROCEDURE — 83540 ASSAY OF IRON: CPT

## 2021-04-28 PROCEDURE — 82728 ASSAY OF FERRITIN: CPT

## 2021-04-28 PROCEDURE — 4040F PNEUMOC VAC/ADMIN/RCVD: CPT | Performed by: INTERNAL MEDICINE

## 2021-04-28 PROCEDURE — 36415 COLL VENOUS BLD VENIPUNCTURE: CPT

## 2021-04-28 PROCEDURE — 99211 OFF/OP EST MAY X REQ PHY/QHP: CPT

## 2021-04-28 PROCEDURE — 1123F ACP DISCUSS/DSCN MKR DOCD: CPT | Performed by: INTERNAL MEDICINE

## 2021-04-28 PROCEDURE — 83550 IRON BINDING TEST: CPT

## 2021-04-28 PROCEDURE — 1090F PRES/ABSN URINE INCON ASSESS: CPT | Performed by: INTERNAL MEDICINE

## 2021-04-28 PROCEDURE — G8427 DOCREV CUR MEDS BY ELIG CLIN: HCPCS | Performed by: INTERNAL MEDICINE

## 2021-04-28 PROCEDURE — 82607 VITAMIN B-12: CPT

## 2021-04-28 PROCEDURE — G8400 PT W/DXA NO RESULTS DOC: HCPCS | Performed by: INTERNAL MEDICINE

## 2021-04-28 PROCEDURE — G8417 CALC BMI ABV UP PARAM F/U: HCPCS | Performed by: INTERNAL MEDICINE

## 2021-04-28 PROCEDURE — 80053 COMPREHEN METABOLIC PANEL: CPT

## 2021-04-28 PROCEDURE — 85025 COMPLETE CBC W/AUTO DIFF WBC: CPT

## 2021-04-28 PROCEDURE — 82746 ASSAY OF FOLIC ACID SERUM: CPT

## 2021-04-28 NOTE — PROGRESS NOTES
Patient Name: Desmond Veliz  Patient : 1945  Patient MRN: [id number]     Primary Oncologist: Den Godoy MD  Referring Physician: Sixto Hedrick MD   GI: Dr Keven Zavala       Date of Service: 2021      Chief Complaint:   Chief Complaint   Patient presents with    Discuss Labs        Active Problem list  1. Thrombocytopenia, unspecified ([facility] [address]    2. Other iron deficiency anemias    3. Intestinal malabsorption, unspecified type    4. Cirrhosis of liver without ascites, unspecified hepatic cirrhosis type ([address]           HPI:        Initial HPI: 2017:  72 YOWF arrived alone. Reported increased fatigue for the past year. Reported that she falls asleep easily even at work. Friday is the last day of work. Denied any overt bleeding. Denied any chest pain, sob. Denied any PICA sx. Reported RUQ yesterday all day, was told that she had /gall bladder disease. Reported that she has been placed on oral iron supplements may 2017 but reports diarrhea associated with oral iron. Denied any fever, drenching night sweats or any weight loss. Reported dark stools when she takes tab. BP at home 190/80. Denies any Gu sx  ferritin 13, sats 10%    2017:Received parental iron and placed on oral iron supplementation. 17:Today in the clinic she arrived alone  -she reports that she feels more energetic after the iron infusion   -reports that she continues to have rt sided abdominal pain  -reports weight loss of about 4 lbs in the past month or so  -denies any overt bleeding  -denies any chest pain, sob  -reports uri sx  -denies any fever    3/22/18:Arrived alone to the clinic. Reported that her energy levels are much better after the iron infusion. Was sick with bronchitis  and 2018. Denied any fever, night sweats. Reported 24 lb weight loss as she walking more and also watching her diet. Has gained 4 lbs. Reported nose bleed when she had the sinus infection but no other overt bleeding.  Reported negative              Vital Signs:  BP (!) 166/81 (Site: Right Upper Arm, Position: Sitting, Cuff Size: Medium Adult)   Pulse 81   Temp 97.3 °F (36.3 °C) (Infrared)   Resp 16   Ht 5' 2\" (1.575 m)   Wt 156 lb 3.2 oz (70.9 kg)   SpO2 94%   BMI 28.57 kg/m²     Physical Exam:    Constitutional: Aao x 3. Head/Face/Neck:ATNC, rt sterno clavicular prominence  Neck-Lymphatic: No lad Eyes: No icterus. No pallor. Mouth-Throat: mouth dry  Cardiovascular: S1S2, rrr, nsr, SM+.  Respiratory: CTABGI Exam: Soft nd nt bs pos Extremities: Bilateral LE edema left >right Neurology: GI.Skin: eczematous rash on the LE      Labs:    Hematology:  Lab Results   Component Value Date    WBC 8.6 10/30/2020    RBC 4.51 10/30/2020    HGB 13.8 10/30/2020    HCT 41.0 10/30/2020    MCV 90.9 10/30/2020    MCH 30.6 10/30/2020    MCHC 33.7 10/30/2020    RDW 13.8 10/30/2020     10/30/2020    MPV 11.1 10/30/2020    BANDSPCT 12 (H) 11/08/2014    SEGSPCT 61.4 10/30/2020    EOSRELPCT 3.4 (H) 10/30/2020    BASOPCT 0.6 10/30/2020    LYMPHOPCT 22.9 (L) 10/30/2020    MONOPCT 11.7 (H) 10/30/2020    BANDABS 2.27 11/08/2014    SEGSABS 5.3 10/30/2020    EOSABS 0.3 10/30/2020    BASOSABS 0.1 10/30/2020    LYMPHSABS 2.0 10/30/2020    MONOSABS 1.0 10/30/2020    DIFFTYPE AUTOMATED DIFFERENTIAL 10/30/2020    POLYCHROM 1+ 11/08/2014    WBCMORP RARE 11/08/2014    PLTM SEVERAL LARGE PLATELETS 63/88/5507     No results found for: ESR    Chemistry:  Lab Results   Component Value Date     10/30/2020    K 3.6 10/30/2020    CL 96 (L) 10/30/2020    CO2 25 10/30/2020    BUN 12 10/30/2020    CREATININE 0.6 10/30/2020    GLUCOSE 192 (H) 10/30/2020    CALCIUM 10.3 10/30/2020    PROT 7.2 10/30/2020    LABALBU 4.3 10/30/2020    BILITOT 0.3 10/30/2020    ALKPHOS 53 10/30/2020    AST 34 10/30/2020    ALT 21 10/30/2020    LABGLOM >60 10/30/2020    GFRAA >60 10/30/2020    PHOS 3.3 04/08/2016    MG 1.7 (L) 11/17/2014    POCGLU 141 (H) 04/20/2016     Lab Results Component Value Date     10/29/2018     No components found for: LD  No results found for: TSHHS, T4FREE, FT3    Immunology:  Lab Results   Component Value Date    PROT 7.2 10/30/2020     No results found for: Zeinab Banks, KLFLCR  No results found for: B2M    Coagulation Panel:  Lab Results   Component Value Date    PROTIME 12.3 10/31/2018    INR 1.08 10/31/2018    APTT 35.3 (H) 10/31/2018    DDIMER >5250 (H) 10/29/2014       Anemia Panel:  Lab Results   Component Value Date    [id number] 472.8 10/30/2020    FOLATE 16.6 10/30/2020       Tumor Markers:  No results found for: , CEA, , LABCA2, PSA      Imaging: Reviewed     Pathology:Reviewed     Observations:  Performance Status: ECOG 0  Depression Status: No data recorded          Assessment & Plan:  Elderly female with h/o PMR on prednisone with megha    Anemia: iron indices were suggestive of MEGHA. Received parental iron. No hemolysis, monoclonal gammopathy, other nutritional def. UA neg for blood. Anemia also probably contributed by aoe/aocd/ulcer, variceal bleed. D/C oral iron. Mild thrombocytopenia: Reviewed meds. No evidence of hemolysis, nutritional def, ctd, RF. Most probably sec to splenic sequestration. Platelet counts stable    cirrhosis: is being followed by GI(Dr Newman)    Coagulopathy:sec to liver disease, Studies otherwise inconclusive    RUQ pain:Chronic, intermittent. Frequency less and intensity less as well. Defers any intervention and follows with GI. HTN:salt restriction diet control and exercise as tolerated    Discussed the findings and the plan. Pt verbalizes understanding    Discussed healthy lifestyle including regular exercise and weight loss. Also discussed the importance of being up-to-date with age-appropriate screening tools. Mammogram due this year 2021, ordered. Colonoscopy due 2022 per her.      Recommend follow up with PCP and other specialists    Please do not hesitate to contact us if you need any further information.     RTC October 2021  or earlier if new sx    BRENNAN           Electronically signed by Jonnathan Trujillo MD on 4/28/2021 at 2:13 PM

## 2021-04-28 NOTE — PROGRESS NOTES
MA Rooming Questions  Patient: Zita Lawson  MRN: [id number]    Date: 4/28/2021        1. Do you have any new issues?   no         2. Do you need any refills on medications?    no    3. Have you had any imaging done since your last visit?   no    4. Have you been hospitalized or seen in the emergency room since your last visit here?   no    5. Did the patient have a depression screening completed today?  Yes    No data recorded     PHQ-9 Given to (if applicable):               PHQ-9 Score (if applicable):                     [] Positive     []  Negative              Does question #9 need addressed (if applicable)                     [] Yes    []  No               Maybelle Shown, CMA

## 2021-04-29 LAB
FOLATE: >20 NG/ML (ref 3.1–17.5)
VITAMIN B-12: 437.9 PG/ML (ref 211–911)

## 2021-04-30 ENCOUNTER — TELEPHONE (OUTPATIENT)
Dept: ONCOLOGY | Age: 76
End: 2021-04-30

## 2021-04-30 NOTE — TELEPHONE ENCOUNTER
Called patient regarding her Mammo [phone] at [address] [place] [address]. Left direct number and prep for patient.

## 2021-05-17 DIAGNOSIS — R92.2 INCONCLUSIVE MAMMOGRAPHY: Primary | ICD-10-CM

## 2021-05-17 NOTE — PROGRESS NOTES
Patient needing additional mammography images/ultrasound on left breast - order faxed to [facility].

## 2021-09-02 ENCOUNTER — HOSPITAL ENCOUNTER (OUTPATIENT)
Dept: NON INVASIVE DIAGNOSTICS | Age: 76
Discharge: HOME | End: 2021-09-02
Payer: MEDICARE

## 2021-09-02 ENCOUNTER — HOSPITAL ENCOUNTER (OUTPATIENT)
Dept: NUCLEAR MEDICINE | Age: 76
Discharge: HOME | End: 2021-09-02
Payer: MEDICARE

## 2021-09-02 DIAGNOSIS — R07.89 OTHER CHEST PAIN: ICD-10-CM

## 2021-09-02 LAB
LV EF: 65 %
LV EF: 71 %
LVEF MODALITY: NORMAL
LVEF MODALITY: NORMAL

## 2021-09-02 PROCEDURE — 78452 HT MUSCLE IMAGE SPECT MULT: CPT

## 2021-09-02 PROCEDURE — 93306 TTE W/DOPPLER COMPLETE: CPT

## 2021-09-02 PROCEDURE — A9500 TC99M SESTAMIBI: HCPCS | Performed by: NURSE PRACTITIONER

## 2021-09-02 PROCEDURE — 3430000000 HC RX DIAGNOSTIC RADIOPHARMACEUTICAL: Performed by: NURSE PRACTITIONER

## 2021-09-02 PROCEDURE — 6360000002 HC RX W HCPCS: Performed by: INTERNAL MEDICINE

## 2021-09-02 PROCEDURE — 93017 CV STRESS TEST TRACING ONLY: CPT

## 2021-09-02 RX ADMIN — KIT FOR THE PREPARATION OF TECHNETIUM TC99M SESTAMIBI 10 MILLICURIE: 1 INJECTION, POWDER, LYOPHILIZED, FOR SOLUTION PARENTERAL at 12:46

## 2021-09-02 RX ADMIN — REGADENOSON 0.4 MG: 0.08 INJECTION, SOLUTION INTRAVENOUS at 11:30

## 2021-09-02 RX ADMIN — KIT FOR THE PREPARATION OF TECHNETIUM TC99M SESTAMIBI 30 MILLICURIE: 1 INJECTION, POWDER, LYOPHILIZED, FOR SOLUTION PARENTERAL at 12:44

## 2021-09-03 ENCOUNTER — APPOINTMENT (OUTPATIENT)
Dept: NUCLEAR MEDICINE | Age: 76
End: 2021-09-03
Payer: MEDICARE

## 2021-10-19 ENCOUNTER — HOSPITAL ENCOUNTER (OUTPATIENT)
Dept: INFUSION THERAPY | Age: 76
Discharge: HOME | End: 2021-10-19
Payer: MEDICARE

## 2021-10-19 DIAGNOSIS — Z12.31 OTHER SCREENING MAMMOGRAM: ICD-10-CM

## 2021-10-19 DIAGNOSIS — D50.8 OTHER IRON DEFICIENCY ANEMIAS: ICD-10-CM

## 2021-10-19 DIAGNOSIS — D69.6 THROMBOCYTOPENIA, UNSPECIFIED (HCC): ICD-10-CM

## 2021-10-19 DIAGNOSIS — K90.9 INTESTINAL MALABSORPTION, UNSPECIFIED TYPE: ICD-10-CM

## 2021-10-19 DIAGNOSIS — K74.60 CIRRHOSIS OF LIVER WITHOUT ASCITES, UNSPECIFIED HEPATIC CIRRHOSIS TYPE (HCC): ICD-10-CM

## 2021-10-19 LAB
ALBUMIN SERPL-MCNC: 4.4 GM/DL (ref 3.4–5)
ALP BLD-CCNC: 73 IU/L (ref 40–129)
ALT SERPL-CCNC: 28 U/L (ref 10–40)
ANION GAP SERPL CALCULATED.3IONS-SCNC: 14 MMOL/L (ref 4–16)
AST SERPL-CCNC: 30 IU/L (ref 15–37)
BASOPHILS ABSOLUTE: 0 K/CU MM
BASOPHILS RELATIVE PERCENT: 0.4 % (ref 0–1)
BILIRUB SERPL-MCNC: 0.3 MG/DL (ref 0–1)
BUN BLDV-MCNC: 16 MG/DL (ref 6–23)
CALCIUM SERPL-MCNC: 9.7 MG/DL (ref 8.3–10.6)
CHLORIDE BLD-SCNC: 98 MMOL/L (ref 99–110)
CO2: 23 MMOL/L (ref 21–32)
CREAT SERPL-MCNC: 0.4 MG/DL (ref 0.6–1.1)
DIFFERENTIAL TYPE: ABNORMAL
EOSINOPHILS ABSOLUTE: 0.3 K/CU MM
EOSINOPHILS RELATIVE PERCENT: 3.6 % (ref 0–3)
FERRITIN: 66 NG/ML (ref 15–150)
GFR AFRICAN AMERICAN: >60 ML/MIN/1.73M2
GFR NON-AFRICAN AMERICAN: >60 ML/MIN/1.73M2
GLUCOSE BLD-MCNC: 193 MG/DL (ref 70–99)
HCT VFR BLD CALC: 37.2 % (ref 37–47)
HEMOGLOBIN: 12.3 GM/DL (ref 12.5–16)
IRON: 50 UG/DL (ref 37–145)
LYMPHOCYTES ABSOLUTE: 2.4 K/CU MM
LYMPHOCYTES RELATIVE PERCENT: 26.6 % (ref 24–44)
MCH RBC QN AUTO: 30.5 PG (ref 27–31)
MCHC RBC AUTO-ENTMCNC: 33.1 % (ref 32–36)
MCV RBC AUTO: 92.3 FL (ref 78–100)
MONOCYTES ABSOLUTE: 0.8 K/CU MM
MONOCYTES RELATIVE PERCENT: 9.1 % (ref 0–4)
PCT TRANSFERRIN: 16 % (ref 10–44)
PDW BLD-RTO: 13.9 % (ref 11.7–14.9)
PLATELET # BLD: 135 K/CU MM (ref 140–440)
PMV BLD AUTO: 10.5 FL (ref 7.5–11.1)
POTASSIUM SERPL-SCNC: 3.6 MMOL/L (ref 3.5–5.1)
RBC # BLD: 4.03 M/CU MM (ref 4.2–5.4)
SEGMENTED NEUTROPHILS ABSOLUTE COUNT: 5.5 K/CU MM
SEGMENTED NEUTROPHILS RELATIVE PERCENT: 60.3 % (ref 36–66)
SODIUM BLD-SCNC: 135 MMOL/L (ref 135–145)
TOTAL IRON BINDING CAPACITY: 311 UG/DL (ref 250–450)
TOTAL PROTEIN: 7 GM/DL (ref 6.4–8.2)
UNSATURATED IRON BINDING CAPACITY: 261 UG/DL (ref 110–370)
WBC # BLD: 9.1 K/CU MM (ref 4–10.5)

## 2021-10-19 PROCEDURE — 36415 COLL VENOUS BLD VENIPUNCTURE: CPT

## 2021-10-19 PROCEDURE — 80053 COMPREHEN METABOLIC PANEL: CPT

## 2021-10-19 PROCEDURE — 83550 IRON BINDING TEST: CPT

## 2021-10-19 PROCEDURE — 82728 ASSAY OF FERRITIN: CPT

## 2021-10-19 PROCEDURE — 85025 COMPLETE CBC W/AUTO DIFF WBC: CPT

## 2021-10-19 PROCEDURE — 83540 ASSAY OF IRON: CPT

## 2021-11-29 ENCOUNTER — HOSPITAL ENCOUNTER (OUTPATIENT)
Dept: INFUSION THERAPY | Age: 76
Discharge: HOME | End: 2021-11-29
Payer: MEDICARE

## 2021-11-29 ENCOUNTER — OFFICE VISIT (OUTPATIENT)
Dept: ONCOLOGY | Age: 76
End: 2021-11-29
Payer: MEDICARE

## 2021-11-29 VITALS
BODY MASS INDEX: 29.48 KG/M2 | DIASTOLIC BLOOD PRESSURE: 76 MMHG | SYSTOLIC BLOOD PRESSURE: 167 MMHG | HEART RATE: 88 BPM | WEIGHT: 160.2 LBS | OXYGEN SATURATION: 91 % | HEIGHT: 62 IN | TEMPERATURE: 97.9 F

## 2021-11-29 DIAGNOSIS — R92.2 INCONCLUSIVE MAMMOGRAPHY: Primary | ICD-10-CM

## 2021-11-29 DIAGNOSIS — K74.60 CIRRHOSIS OF LIVER WITHOUT ASCITES, UNSPECIFIED HEPATIC CIRRHOSIS TYPE (HCC): ICD-10-CM

## 2021-11-29 DIAGNOSIS — D50.8 OTHER IRON DEFICIENCY ANEMIAS: ICD-10-CM

## 2021-11-29 DIAGNOSIS — D69.6 THROMBOCYTOPENIA, UNSPECIFIED (HCC): ICD-10-CM

## 2021-11-29 DIAGNOSIS — K90.9 INTESTINAL MALABSORPTION, UNSPECIFIED TYPE: ICD-10-CM

## 2021-11-29 PROCEDURE — 99211 OFF/OP EST MAY X REQ PHY/QHP: CPT

## 2021-11-29 PROCEDURE — 1123F ACP DISCUSS/DSCN MKR DOCD: CPT | Performed by: INTERNAL MEDICINE

## 2021-11-29 PROCEDURE — 3017F COLORECTAL CA SCREEN DOC REV: CPT | Performed by: INTERNAL MEDICINE

## 2021-11-29 PROCEDURE — G8427 DOCREV CUR MEDS BY ELIG CLIN: HCPCS | Performed by: INTERNAL MEDICINE

## 2021-11-29 PROCEDURE — 99214 OFFICE O/P EST MOD 30 MIN: CPT | Performed by: INTERNAL MEDICINE

## 2021-11-29 PROCEDURE — 4040F PNEUMOC VAC/ADMIN/RCVD: CPT | Performed by: INTERNAL MEDICINE

## 2021-11-29 PROCEDURE — G8484 FLU IMMUNIZE NO ADMIN: HCPCS | Performed by: INTERNAL MEDICINE

## 2021-11-29 PROCEDURE — G8417 CALC BMI ABV UP PARAM F/U: HCPCS | Performed by: INTERNAL MEDICINE

## 2021-11-29 PROCEDURE — 1036F TOBACCO NON-USER: CPT | Performed by: INTERNAL MEDICINE

## 2021-11-29 PROCEDURE — 1090F PRES/ABSN URINE INCON ASSESS: CPT | Performed by: INTERNAL MEDICINE

## 2021-11-29 PROCEDURE — G8400 PT W/DXA NO RESULTS DOC: HCPCS | Performed by: INTERNAL MEDICINE

## 2021-11-29 RX ORDER — FENOFIBRATE 145 MG/1
TABLET, COATED ORAL
COMMUNITY
Start: 2021-10-26

## 2021-11-29 RX ORDER — OLMESARTAN MEDOXOMIL AND HYDROCHLOROTHIAZIDE 40/25 40; 25 MG/1; MG/1
TABLET ORAL
COMMUNITY
Start: 2021-10-19

## 2021-11-29 RX ORDER — ISOSORBIDE MONONITRATE 60 MG/1
TABLET, EXTENDED RELEASE ORAL
COMMUNITY
Start: 2021-11-04

## 2021-11-29 RX ORDER — CARVEDILOL 25 MG/1
TABLET ORAL
COMMUNITY
Start: 2021-11-27

## 2021-11-29 RX ORDER — ATORVASTATIN CALCIUM 20 MG/1
TABLET, FILM COATED ORAL
COMMUNITY
Start: 2021-10-26

## 2021-11-29 RX ORDER — HYDRALAZINE HYDROCHLORIDE 25 MG/1
TABLET, FILM COATED ORAL
COMMUNITY
Start: 2021-09-28

## 2021-11-29 RX ORDER — AMLODIPINE BESYLATE 5 MG/1
TABLET ORAL
COMMUNITY
Start: 2021-11-19

## 2021-11-29 RX ORDER — SITAGLIPTIN AND METFORMIN HYDROCHLORIDE 1000; 50 MG/1; MG/1
TABLET, FILM COATED ORAL
COMMUNITY
Start: 2021-11-22

## 2021-11-29 ASSESSMENT — PATIENT HEALTH QUESTIONNAIRE - PHQ9
2. FEELING DOWN, DEPRESSED OR HOPELESS: 0
SUM OF ALL RESPONSES TO PHQ QUESTIONS 1-9: 0
1. LITTLE INTEREST OR PLEASURE IN DOING THINGS: 0
SUM OF ALL RESPONSES TO PHQ QUESTIONS 1-9: 0
SUM OF ALL RESPONSES TO PHQ9 QUESTIONS 1 & 2: 0
SUM OF ALL RESPONSES TO PHQ QUESTIONS 1-9: 0

## 2021-11-29 NOTE — PROGRESS NOTES
MA Rooming Questions  Patient: Kamari Spaulding  MRN: [id number]    Date: 11/29/2021        1. Do you have any new issues? yes - swelling in legs and ankles x 2 years         2. Do you need any refills on medications?    no    3. Have you had any imaging done since your last visit? yes - labs 10/19    4. Have you been hospitalized or seen in the emergency room since your last visit here?   no    5. Did the patient have a depression screening completed today?  Yes    PHQ-9 Total Score: 0 (11/29/2021  3:39 PM)       PHQ-9 Given to (if applicable):               PHQ-9 Score (if applicable):                     [] Positive     []  Negative              Does question #9 need addressed (if applicable)                     [] Yes    []  No               Arthea Jamey CMA

## 2021-11-29 NOTE — PROGRESS NOTES
Patient Name: Ernie Garcia  Patient : 1945  Patient MRN: [id number]     Primary Oncologist: Surinder Mauricio MD  Referring Physician: Ruth Holland MD   GI: Dr Major Opitz       Date of Service: 2021      Chief Complaint:   Chief Complaint   Patient presents with    Follow-up        Active Problem list  1. Inconclusive mammography    2. Thrombocytopenia, unspecified (HCC)    3. Other iron deficiency anemias    4. Intestinal malabsorption, unspecified type    5. Cirrhosis of liver without ascites, unspecified hepatic cirrhosis type ([facility] [address]           HPI:        Initial HPI: 2017:  72 YOWF arrived alone. Reported increased fatigue for the past year. Reported that she falls asleep easily even at work. Friday is the last day of work. Denied any overt bleeding. Denied any chest pain, sob. Denied any PICA sx. Reported RUQ yesterday all day, was told that she had /gall bladder disease. Reported that she has been placed on oral iron supplements may 2017 but reports diarrhea associated with oral iron. Denied any fever, drenching night sweats or any weight loss. Reported dark stools when she takes tab. BP at home 190/80. Denies any Gu sx  ferritin 13, sats 10%    2017:Received parental iron and placed on oral iron supplementation. 17:Today in the clinic she arrived alone  -she reports that she feels more energetic after the iron infusion   -reports that she continues to have rt sided abdominal pain  -reports weight loss of about 4 lbs in the past month or so  -denies any overt bleeding  -denies any chest pain, sob  -reports uri sx  -denies any fever    3/22/18:Arrived alone to the clinic. Reported that her energy levels are much better after the iron infusion. Was sick with bronchitis  and 2018. Denied any fever, night sweats. Reported 24 lb weight loss as she walking more and also watching her diet. Has gained 4 lbs.  Reported nose bleed when she had the sinus infection but no other overt bleeding. Reported eczematous rash. Denied any chest pain, increased sob, cough, palpitation. Reported that she continues to have RUQ pain, has follow up appt with GI.     C-scope may 2015 with internal hemmorrhoids and diverticulosis    C scope 4/30/2018 with tubular adenoma of the sigmoid colon tubular adenoma of the transverse colon and diverticulosis of the sigmoid colon and descending colon. Hemorrhoids grade 2. EGD 6/25/2018 with hiatal hernia, dilated gastric stenosis, nonbleeding gastric ulcer, chronic gastritis. 11/13/18: Ct chest, abdomen and pelvis:IMPRESSION: No findings suspicious for malignancy within the chest, abdomen or pelvis. Hepatosplenomegaly with nodular liver contour and hepatic steatosis. Additional features suggesting some degree of portal venous hypertension. No abdominal ascites. 9/2019: Received parental iron    9/2019 CT scan of the chest showed small mediastinal lymph nodes. No lung nodules. Old granulomatous disease    2/17/2020 iron saturation of 20% folate of more than 20 ferritin 283 CMP with the BUN of 16 creatinine 0.7 albumin of 4.3 B12 688 CBC with WBC of 9.6 hemoglobin of 13.6 hematocrit 40.3 MCV of 93.5 and platelets 369    5/3/0885:TVT:  Summary    No EKG changes suggestive of ischemia with Lexiscan infusion.    Normal perfusion uptake noted    no reversible ischemia noted    gating shows EF 71%         PMH:  DM  HTN  HLP  CAD  Polymyalgia rheumatica    PSH:   Hysterectomy  Foot surgeries    Allergies:  NKDA    SH:   Lives with son  No etoh/tob or any other illicit drug abuse    FH: NC    Interval History  11/29/2021: Arrived alone to the clinic today. Overall feels Ok. Reported that her energy levels are much better. Denied any pain in the abdomen currently. Nausea at times but no emesis. Denied any diarrhea constipation. Reported chronic insomnia. Reported lower extremity ankle edema left more than right which has been chronic as well.   Denied any chest pain or shortness of breath. Denied any fever or cough. Reported that she made a quilt and wishes to share with the cancer center. Review of Systems   Per interval history; otherwise 10 point ROS is negative              Vital Signs:  BP (!) 167/76 (Site: Right Upper Arm, Position: Sitting, Cuff Size: Medium Adult)   Pulse 88   Temp 97.9 °F (36.6 °C) (Temporal)   Ht 5' 2\" (1.575 m)   Wt 160 lb 3.2 oz (72.7 kg)   SpO2 91%   BMI 29.30 kg/m²     Physical Exam:    Constitutional: Aao x 3. Head/Face/Neck:ATNC, rt sterno clavicular prominence  Neck-Lymphatic: No lad Eyes: No icterus. No pallor. Mouth-Throat: mouth dry  Cardiovascular: S1S2, rrr, nsr, SM+.  Respiratory: CTABGI Exam: Soft nd nt bs pos Extremities: Bilateral LE edema left >right Neurology: GI.Skin: eczematous rash on the LE      Labs:    Hematology:  Lab Results   Component Value Date    WBC 9.1 10/19/2021    RBC 4.03 (L) 10/19/2021    HGB 12.3 (L) 10/19/2021    HCT 37.2 10/19/2021    MCV 92.3 10/19/2021    MCH 30.5 10/19/2021    MCHC 33.1 10/19/2021    RDW 13.9 10/19/2021     (L) 10/19/2021    MPV 10.5 10/19/2021    BANDSPCT 12 (H) 11/08/2014    SEGSPCT 60.3 10/19/2021    EOSRELPCT 3.6 (H) 10/19/2021    BASOPCT 0.4 10/19/2021    LYMPHOPCT 26.6 10/19/2021    MONOPCT 9.1 (H) 10/19/2021    BANDABS 2.27 11/08/2014    SEGSABS 5.5 10/19/2021    EOSABS 0.3 10/19/2021    BASOSABS 0.0 10/19/2021    LYMPHSABS 2.4 10/19/2021    MONOSABS 0.8 10/19/2021    DIFFTYPE AUTOMATED DIFFERENTIAL 10/19/2021    POLYCHROM 1+ 11/08/2014    WBCMORP RARE 11/08/2014    PLTM SEVERAL LARGE PLATELETS 22/50/8514     No results found for: ESR    Chemistry:  Lab Results   Component Value Date     10/19/2021    K 3.6 10/19/2021    CL 98 (L) 10/19/2021    CO2 23 10/19/2021    BUN 16 10/19/2021    CREATININE 0.4 (L) 10/19/2021    GLUCOSE 193 (H) 10/19/2021    CALCIUM 9.7 10/19/2021    PROT 7.0 10/19/2021    LABALBU 4.4 10/19/2021    BILITOT 0.3 10/19/2021 ALKPHOS 73 10/19/2021    AST 30 10/19/2021    ALT 28 10/19/2021    LABGLOM >60 10/19/2021    GFRAA >60 10/19/2021    PHOS 3.3 04/08/2016    MG 1.7 (L) 11/17/2014    POCGLU 141 (H) 04/20/2016     Lab Results   Component Value Date     10/29/2018     No components found for: LD  No results found for: TSHHS, T4FREE, FT3    Immunology:  Lab Results   Component Value Date    PROT 7.0 10/19/2021     No results found for: Malva Harbour, KLFLCR  No results found for: B2M    Coagulation Panel:  Lab Results   Component Value Date    PROTIME 12.3 10/31/2018    INR 1.08 10/31/2018    APTT 35.3 (H) 10/31/2018    DDIMER >5250 (H) 10/29/2014       Anemia Panel:  Lab Results   Component Value Date    HYZRAEEE39 437.9 04/28/2021    FOLATE >20.0 (H) 04/28/2021       Tumor Markers:  No results found for: , CEA, , LABCA2, PSA      Imaging: Reviewed     Pathology:Reviewed     Observations:  Performance Status: ECOG 0  Depression Status: PHQ-9 Total Score: 0 (11/29/2021  3:39 PM)          Assessment & Plan:  Elderly female with h/o PMR on prednisone with megha    Anemia: iron indices were suggestive of MEGHA. Received parental iron. No hemolysis, monoclonal gammopathy, other nutritional def. UA neg for blood. Anemia also probably contributed by aoe/aocd/ulcer, variceal bleed. Hemoglobin 12.3 in October 2021, ferritin of 66 iron saturations of 16%. Not on any oral iron supplementation and wishes to be monitored off of it for now. Recommend follow-up with GI, is due for colonoscopy. Continue to monitor for now. Mild thrombocytopenia: Reviewed meds. No evidence of hemolysis, nutritional def, ctd, RF. Most probably sec to splenic sequestration. Platelet counts stable, recommend follow-up with GI    cirrhosis: is being followed by GI(Dr Newman)    Coagulopathy:sec to liver disease, Studies otherwise inconclusive    RUQ pain: Resolved    HTN:salt restriction diet control and exercise as tolerated.   Maintain a log and discuss with PCP, may need adjustment of her antihypertensives    Discussed the findings and the plan. Pt verbalizes understanding    Discussed healthy lifestyle including regular exercise and weight loss. Also discussed the importance of being up-to-date with age-appropriate screening tools. Mammogram due this year 2021, ordered(advised her to follow-up on that),  colonoscopy due 2022, to follow-up with GI. Received COVID vaccine, booster and also flu vaccine    Recommend follow up with PCP and other specialists    Please do not hesitate to contact us if you need any further information.     RTC June 2022 or earlier if new sx    BRENNAN           Electronically signed by Darrell Coburn MD on 11/29/2021 at 10:54 PM

## 2022-06-21 ENCOUNTER — HOSPITAL ENCOUNTER (OUTPATIENT)
Dept: INFUSION THERAPY | Age: 77
Discharge: HOME | End: 2022-06-21
Payer: MEDICARE

## 2022-06-21 DIAGNOSIS — K74.60 CIRRHOSIS OF LIVER WITHOUT ASCITES, UNSPECIFIED HEPATIC CIRRHOSIS TYPE (HCC): ICD-10-CM

## 2022-06-21 DIAGNOSIS — K90.9 INTESTINAL MALABSORPTION, UNSPECIFIED TYPE: ICD-10-CM

## 2022-06-21 DIAGNOSIS — D50.8 OTHER IRON DEFICIENCY ANEMIAS: ICD-10-CM

## 2022-06-21 DIAGNOSIS — D69.6 THROMBOCYTOPENIA, UNSPECIFIED (HCC): ICD-10-CM

## 2022-06-21 LAB
ALBUMIN SERPL-MCNC: 4.5 GM/DL (ref 3.4–5)
ALP BLD-CCNC: 70 IU/L (ref 40–129)
ALT SERPL-CCNC: 28 U/L (ref 10–40)
ANION GAP SERPL CALCULATED.3IONS-SCNC: 16 MMOL/L (ref 4–16)
AST SERPL-CCNC: 30 IU/L (ref 15–37)
BASOPHILS ABSOLUTE: 0.1 K/CU MM
BASOPHILS RELATIVE PERCENT: 0.6 % (ref 0–1)
BILIRUB SERPL-MCNC: 0.4 MG/DL (ref 0–1)
BUN BLDV-MCNC: 17 MG/DL (ref 6–23)
CALCIUM SERPL-MCNC: 9.8 MG/DL (ref 8.3–10.6)
CHLORIDE BLD-SCNC: 99 MMOL/L (ref 99–110)
CO2: 25 MMOL/L (ref 21–32)
CREAT SERPL-MCNC: 0.6 MG/DL (ref 0.6–1.1)
DIFFERENTIAL TYPE: ABNORMAL
EOSINOPHILS ABSOLUTE: 0.2 K/CU MM
EOSINOPHILS RELATIVE PERCENT: 2.3 % (ref 0–3)
FERRITIN: 24 NG/ML (ref 15–150)
FOLATE: >20 NG/ML (ref 3.1–17.5)
GFR AFRICAN AMERICAN: >60 ML/MIN/1.73M2
GFR NON-AFRICAN AMERICAN: >60 ML/MIN/1.73M2
GLUCOSE BLD-MCNC: 233 MG/DL (ref 70–99)
HCT VFR BLD CALC: 40.1 % (ref 37–47)
HEMOGLOBIN: 13 GM/DL (ref 12.5–16)
IRON: 52 UG/DL (ref 37–145)
LYMPHOCYTES ABSOLUTE: 1.8 K/CU MM
LYMPHOCYTES RELATIVE PERCENT: 18.8 % (ref 24–44)
MCH RBC QN AUTO: 28 PG (ref 27–31)
MCHC RBC AUTO-ENTMCNC: 32.4 % (ref 32–36)
MCV RBC AUTO: 86.2 FL (ref 78–100)
MONOCYTES ABSOLUTE: 0.9 K/CU MM
MONOCYTES RELATIVE PERCENT: 9.8 % (ref 0–4)
PCT TRANSFERRIN: 13 % (ref 10–44)
PDW BLD-RTO: 15 % (ref 11.7–14.9)
PLATELET # BLD: 150 K/CU MM (ref 140–440)
PMV BLD AUTO: 11 FL (ref 7.5–11.1)
POTASSIUM SERPL-SCNC: 3.7 MMOL/L (ref 3.5–5.1)
RBC # BLD: 4.65 M/CU MM (ref 4.2–5.4)
SEGMENTED NEUTROPHILS ABSOLUTE COUNT: 6.5 K/CU MM
SEGMENTED NEUTROPHILS RELATIVE PERCENT: 68.5 % (ref 36–66)
SODIUM BLD-SCNC: 140 MMOL/L (ref 135–145)
TOTAL IRON BINDING CAPACITY: 389 UG/DL (ref 250–450)
TOTAL PROTEIN: 7.6 GM/DL (ref 6.4–8.2)
UNSATURATED IRON BINDING CAPACITY: 337 UG/DL (ref 110–370)
VITAMIN B-12: 597.5 PG/ML (ref 211–911)
WBC # BLD: 9.5 K/CU MM (ref 4–10.5)

## 2022-06-21 PROCEDURE — 82607 VITAMIN B-12: CPT

## 2022-06-21 PROCEDURE — 80053 COMPREHEN METABOLIC PANEL: CPT

## 2022-06-21 PROCEDURE — 82728 ASSAY OF FERRITIN: CPT

## 2022-06-21 PROCEDURE — 83540 ASSAY OF IRON: CPT

## 2022-06-21 PROCEDURE — 85025 COMPLETE CBC W/AUTO DIFF WBC: CPT

## 2022-06-21 PROCEDURE — 83550 IRON BINDING TEST: CPT

## 2022-06-21 PROCEDURE — 82746 ASSAY OF FOLIC ACID SERUM: CPT

## 2022-06-27 ENCOUNTER — HOSPITAL ENCOUNTER (OUTPATIENT)
Dept: INFUSION THERAPY | Age: 77
Discharge: HOME | End: 2022-06-27

## 2022-06-27 ENCOUNTER — OFFICE VISIT (OUTPATIENT)
Dept: ONCOLOGY | Age: 77
End: 2022-06-27
Payer: MEDICARE

## 2022-06-27 VITALS
TEMPERATURE: 97.6 F | WEIGHT: 157.8 LBS | HEART RATE: 84 BPM | HEIGHT: 62 IN | OXYGEN SATURATION: 94 % | BODY MASS INDEX: 29.04 KG/M2 | DIASTOLIC BLOOD PRESSURE: 69 MMHG | RESPIRATION RATE: 16 BRPM | SYSTOLIC BLOOD PRESSURE: 147 MMHG

## 2022-06-27 DIAGNOSIS — Z12.31 OTHER SCREENING MAMMOGRAM: ICD-10-CM

## 2022-06-27 DIAGNOSIS — K74.60 CIRRHOSIS OF LIVER WITHOUT ASCITES, UNSPECIFIED HEPATIC CIRRHOSIS TYPE (HCC): ICD-10-CM

## 2022-06-27 DIAGNOSIS — K90.9 INTESTINAL MALABSORPTION, UNSPECIFIED TYPE: ICD-10-CM

## 2022-06-27 DIAGNOSIS — D69.6 THROMBOCYTOPENIA, UNSPECIFIED (HCC): Primary | ICD-10-CM

## 2022-06-27 DIAGNOSIS — R10.11 RIGHT UPPER QUADRANT PAIN: ICD-10-CM

## 2022-06-27 DIAGNOSIS — D50.8 OTHER IRON DEFICIENCY ANEMIAS: ICD-10-CM

## 2022-06-27 PROCEDURE — G8427 DOCREV CUR MEDS BY ELIG CLIN: HCPCS | Performed by: INTERNAL MEDICINE

## 2022-06-27 PROCEDURE — G8400 PT W/DXA NO RESULTS DOC: HCPCS | Performed by: INTERNAL MEDICINE

## 2022-06-27 PROCEDURE — 3017F COLORECTAL CA SCREEN DOC REV: CPT | Performed by: INTERNAL MEDICINE

## 2022-06-27 PROCEDURE — 99214 OFFICE O/P EST MOD 30 MIN: CPT | Performed by: INTERNAL MEDICINE

## 2022-06-27 PROCEDURE — 1090F PRES/ABSN URINE INCON ASSESS: CPT | Performed by: INTERNAL MEDICINE

## 2022-06-27 PROCEDURE — G8417 CALC BMI ABV UP PARAM F/U: HCPCS | Performed by: INTERNAL MEDICINE

## 2022-06-27 PROCEDURE — 1036F TOBACCO NON-USER: CPT | Performed by: INTERNAL MEDICINE

## 2022-06-27 PROCEDURE — 1123F ACP DISCUSS/DSCN MKR DOCD: CPT | Performed by: INTERNAL MEDICINE

## 2022-06-27 RX ORDER — SENNOSIDES 8.6 MG
650 CAPSULE ORAL EVERY 8 HOURS PRN
COMMUNITY
Start: 2022-02-16

## 2022-06-27 RX ORDER — CELECOXIB 200 MG/1
CAPSULE ORAL
COMMUNITY
Start: 2022-06-21

## 2022-06-27 ASSESSMENT — PATIENT HEALTH QUESTIONNAIRE - PHQ9
SUM OF ALL RESPONSES TO PHQ QUESTIONS 1-9: 0
1. LITTLE INTEREST OR PLEASURE IN DOING THINGS: 0
SUM OF ALL RESPONSES TO PHQ QUESTIONS 1-9: 0
SUM OF ALL RESPONSES TO PHQ QUESTIONS 1-9: 0
2. FEELING DOWN, DEPRESSED OR HOPELESS: 0
SUM OF ALL RESPONSES TO PHQ9 QUESTIONS 1 & 2: 0
SUM OF ALL RESPONSES TO PHQ QUESTIONS 1-9: 0

## 2022-06-27 NOTE — PROGRESS NOTES
Patient Name: Ashlie Pack  Patient : 1945  Patient MRN: [id number]     Primary Oncologist: Gemma Che MD  Referring Physician: Jose Jewell MD   GI: Dr Willie Gonzalez       Date of Service: 2022      Chief Complaint:   Chief Complaint   Patient presents with    Follow-up        Active Problem list  1. Thrombocytopenia, unspecified ([facility] [address]    2. Other iron deficiency anemias    3. Intestinal malabsorption, unspecified type    4. Cirrhosis of liver without ascites, unspecified hepatic cirrhosis type ([address]    5. Other screening mammogram           HPI:        Initial HPI: 2017:  81 YOWF arrived alone. Reported increased fatigue for the past year. Reported that she falls asleep easily even at work. Friday is the last day of work. Denied any overt bleeding. Denied any chest pain, sob. Denied any PICA sx. Reported RUQ yesterday all day, was told that she had /gall bladder disease. Reported that she has been placed on oral iron supplements may 2017 but reports diarrhea associated with oral iron. Denied any fever, drenching night sweats or any weight loss. Reported dark stools when she takes tab. BP at home 190/80. Denies any Gu sx  ferritin 13, sats 10%    2017:Received parental iron and placed on oral iron supplementation. 17:Today in the clinic she arrived alone  -she reports that she feels more energetic after the iron infusion   -reports that she continues to have rt sided abdominal pain  -reports weight loss of about 4 lbs in the past month or so  -denies any overt bleeding  -denies any chest pain, sob  -reports uri sx  -denies any fever    3/22/18:Arrived alone to the clinic. Reported that her energy levels are much better after the iron infusion. Was sick with bronchitis  and 2018. Denied any fever, night sweats. Reported 24 lb weight loss as she walking more and also watching her diet. Has gained 4 lbs.  Reported nose bleed when she had the sinus infection but no other overt bleeding. Reported eczematous rash. Denied any chest pain, increased sob, cough, palpitation. Reported that she continues to have RUQ pain, has follow up appt with GI.     C-scope may 2015 with internal hemmorrhoids and diverticulosis    C scope 4/30/2018 with tubular adenoma of the sigmoid colon tubular adenoma of the transverse colon and diverticulosis of the sigmoid colon and descending colon. Hemorrhoids grade 2. EGD 6/25/2018 with hiatal hernia, dilated gastric stenosis, nonbleeding gastric ulcer, chronic gastritis. 11/13/18: Ct chest, abdomen and pelvis:IMPRESSION: No findings suspicious for malignancy within the chest, abdomen or pelvis. Hepatosplenomegaly with nodular liver contour and hepatic steatosis. Additional features suggesting some degree of portal venous hypertension. No abdominal ascites. 9/2019: Received parental iron    9/2019 CT scan of the chest showed small mediastinal lymph nodes. No lung nodules. Old granulomatous disease    2/17/2020 iron saturation of 20% folate of more than 20 ferritin 283 CMP with the BUN of 16 creatinine 0.7 albumin of 4.3 B12 688 CBC with WBC of 9.6 hemoglobin of 13.6 hematocrit 40.3 MCV of 93.5 and platelets 556    4/1/6461:TVA:  Summary    No EKG changes suggestive of ischemia with Lexiscan infusion.    Normal perfusion uptake noted    no reversible ischemia noted    gating shows EF 71%         PMH:  DM  HTN  HLP  CAD  Polymyalgia rheumatica    PSH:   Hysterectomy  Foot surgeries    Allergies:  NKDA    SH:   Lives with son  No etoh/tob or any other illicit drug abuse    FH: NC    Interval History  6/27/2022: Arrived alone to the clinic today. Reported that she feels tired all the time. No bleeding. No PICA sx. Intermittent right sided chest pain. No increased sob, palpitations. No dizziness. No fever. Dry cough. Has seasonal allergies. RUQ pain for the past 3-4 months. Pain persistent all the time. No radiation.  No association with food but nausea associated with food intake. No diarrhea or any constipation. No weight loss recently. No  sx.     Review of Systems   Per interval history; otherwise 10 point ROS is negative              Vital Signs:  BP (!) 147/69 (Site: Left Upper Arm, Position: Sitting, Cuff Size: Medium Adult)   Pulse 84   Temp 97.6 °F (36.4 °C) (Temporal)   Resp 16   Ht 5' 2\" (1.575 m)   Wt 157 lb 12.8 oz (71.6 kg)   SpO2 94%   BMI 28.86 kg/m²     Physical Exam:    Constitutional: Aao x 3. Head/Face/Neck:ATNC, rt sterno clavicular prominence  Neck-Lymphatic: No lad Eyes: No icterus. No pallor. Mouth-Throat: mouth dry  Cardiovascular: S1S2, rrr, nsr, SM+.  Respiratory: CTABGI Exam: Soft nd nt bs pos Extremities: Bilateral LE edema left >right Neurology: GI.Skin: eczematous rash on the LE  Breast:could not palpate any breast mass or axillary lad      Labs:    Hematology:  Lab Results   Component Value Date    WBC 9.5 06/21/2022    RBC 4.65 06/21/2022    HGB 13.0 06/21/2022    HCT 40.1 06/21/2022    MCV 86.2 06/21/2022    MCH 28.0 06/21/2022    MCHC 32.4 06/21/2022    RDW 15.0 (H) 06/21/2022     06/21/2022    MPV 11.0 06/21/2022    BANDSPCT 12 (H) 11/08/2014    SEGSPCT 68.5 (H) 06/21/2022    EOSRELPCT 2.3 06/21/2022    BASOPCT 0.6 06/21/2022    LYMPHOPCT 18.8 (L) 06/21/2022    MONOPCT 9.8 (H) 06/21/2022    BANDABS 2.27 11/08/2014    SEGSABS 6.5 06/21/2022    EOSABS 0.2 06/21/2022    BASOSABS 0.1 06/21/2022    LYMPHSABS 1.8 06/21/2022    MONOSABS 0.9 06/21/2022    DIFFTYPE AUTOMATED DIFFERENTIAL 06/21/2022    POLYCHROM 1+ 11/08/2014    WBCMORP RARE 11/08/2014    PLTM SEVERAL LARGE PLATELETS 06/89/0127     No results found for: ESR    Chemistry:  Lab Results   Component Value Date     06/21/2022    K 3.7 06/21/2022    CL 99 06/21/2022    CO2 25 06/21/2022    BUN 17 06/21/2022    CREATININE 0.6 06/21/2022    GLUCOSE 233 (H) 06/21/2022    CALCIUM 9.8 06/21/2022    PROT 7.6 06/21/2022    LABALBU 4.5 06/21/2022    BILITOT 0.4 06/21/2022    ALKPHOS 70 06/21/2022    AST 30 06/21/2022    ALT 28 06/21/2022    LABGLOM >60 06/21/2022    GFRAA >60 06/21/2022    PHOS 3.3 04/08/2016    MG 1.7 (L) 11/17/2014    POCGLU 141 (H) 04/20/2016     Lab Results   Component Value Date     10/29/2018     No components found for: LD  No results found for: TSHHS, T4FREE, FT3    Immunology:  Lab Results   Component Value Date    PROT 7.6 06/21/2022     No results found for: Blease Makos, KLFLCR  No results found for: B2M    Coagulation Panel:  Lab Results   Component Value Date    PROTIME 12.3 10/31/2018    INR 1.08 10/31/2018    APTT 35.3 (H) 10/31/2018    DDIMER >5250 (H) 10/29/2014       Anemia Panel:  Lab Results   Component Value Date    [id number] 597.5 06/21/2022    FOLATE >20.0 (H) 06/21/2022       Tumor Markers:  No results found for: , CEA, , LABCA2, PSA      Imaging: Reviewed     Pathology:Reviewed     Observations:  Performance Status: ECOG 0  Depression Status: PHQ-9 Total Score: 0 (6/27/2022  3:45 PM)          Assessment & Plan:  Elderly female with h/o PMR on prednisone with megha    Anemia: iron indices in the past  were suggestive of MEGHA. Received parental iron. No hemolysis, monoclonal gammopathy, other nutritional def. UA neg for blood. Anemia also probably contributed by aoe/aocd/ulcer, variceal bleed. Not on any oral iron supplementation. Ferritin 24, Hb 13. Recommend oral iron bid for a month and then OD. Recommend follow up with GI    Mild intermittent  thrombocytopenia: Reviewed meds. No evidence of hemolysis, nutritional def, ctd, RF. Most probably sec to splenic sequestration. cirrhosis: is being followed by GI(Dr Newman)    Coagulopathy:sec to liver disease, Studies otherwise inconclusive    RUQ pain: Will order RUQ ultrasound     HTN:salt restriction diet control and exercise as tolerated. Maintain a log and discuss with PCP, may need adjustment of her antihypertensives    ? left breast mass: mammogram pending    Discussed the findings and the plan. Pt verbalizes understanding    Discussed healthy lifestyle including regular exercise and weight loss. Also discussed the importance of being up-to-date with age-appropriate screening tools. Mammogram pending  Due for colonoscopy, has appt with GI      Recommend follow up with PCP and other specialists    Please do not hesitate to contact us if you need any further information.     RTC  October 2022 or earlier if new sx    BRENNAN           Electronically signed by John Bansal MD on 6/27/2022 at 4:01 PM

## 2022-06-27 NOTE — PROGRESS NOTES
MA Rooming Questions  Patient: Harry Cruz  MRN: [id number]    Date: 6/27/2022        1. Do you have any new issues?   no         2. Do you need any refills on medications?    no    3. Have you had any imaging done since your last visit? yes - XRAY- [place]    4. Have you been hospitalized or seen in the emergency room since your last visit here?   no    5. Did the patient have a depression screening completed today?  Yes    PHQ-9 Total Score: 0 (6/27/2022  3:45 PM)       PHQ-9 Given to (if applicable):               PHQ-9 Score (if applicable):                     [] Positive     []  Negative              Does question #9 need addressed (if applicable)                     [] Yes    []  No               Douglas Nuñez, CMA

## 2022-07-18 ENCOUNTER — HOSPITAL ENCOUNTER (OUTPATIENT)
Dept: ULTRASOUND IMAGING | Age: 77
Discharge: HOME | End: 2022-07-18
Payer: MEDICARE

## 2022-07-18 DIAGNOSIS — R10.11 RIGHT UPPER QUADRANT PAIN: ICD-10-CM

## 2022-07-18 PROCEDURE — 76705 ECHO EXAM OF ABDOMEN: CPT

## 2022-11-22 ENCOUNTER — HOSPITAL ENCOUNTER (OUTPATIENT)
Dept: INFUSION THERAPY | Age: 77
Discharge: HOME | End: 2022-11-22
Payer: MEDICARE

## 2022-11-22 DIAGNOSIS — K90.9 INTESTINAL MALABSORPTION, UNSPECIFIED TYPE: ICD-10-CM

## 2022-11-22 DIAGNOSIS — K74.60 CIRRHOSIS OF LIVER WITHOUT ASCITES, UNSPECIFIED HEPATIC CIRRHOSIS TYPE (HCC): ICD-10-CM

## 2022-11-22 DIAGNOSIS — R10.11 RIGHT UPPER QUADRANT PAIN: ICD-10-CM

## 2022-11-22 DIAGNOSIS — D69.6 THROMBOCYTOPENIA, UNSPECIFIED (HCC): ICD-10-CM

## 2022-11-22 DIAGNOSIS — Z12.31 OTHER SCREENING MAMMOGRAM: ICD-10-CM

## 2022-11-22 DIAGNOSIS — D50.8 OTHER IRON DEFICIENCY ANEMIAS: ICD-10-CM

## 2022-11-22 LAB
ALBUMIN SERPL-MCNC: 4.4 GM/DL (ref 3.4–5)
ALP BLD-CCNC: 47 IU/L (ref 40–129)
ALT SERPL-CCNC: 26 U/L (ref 10–40)
ANION GAP SERPL CALCULATED.3IONS-SCNC: 12 MMOL/L (ref 4–16)
AST SERPL-CCNC: 37 IU/L (ref 15–37)
BASOPHILS ABSOLUTE: 0 K/CU MM
BASOPHILS RELATIVE PERCENT: 0.6 % (ref 0–1)
BILIRUB SERPL-MCNC: 0.2 MG/DL (ref 0–1)
BUN BLDV-MCNC: 15 MG/DL (ref 6–23)
CALCIUM SERPL-MCNC: 10.6 MG/DL (ref 8.3–10.6)
CHLORIDE BLD-SCNC: 98 MMOL/L (ref 99–110)
CO2: 26 MMOL/L (ref 21–32)
CREAT SERPL-MCNC: 0.5 MG/DL (ref 0.6–1.1)
DIFFERENTIAL TYPE: ABNORMAL
EOSINOPHILS ABSOLUTE: 0.3 K/CU MM
EOSINOPHILS RELATIVE PERCENT: 3.8 % (ref 0–3)
FERRITIN: 21 NG/ML (ref 15–150)
FOLATE: >20 NG/ML (ref 3.1–17.5)
GFR SERPL CREATININE-BSD FRML MDRD: >60 ML/MIN/1.73M2
GLUCOSE BLD-MCNC: 235 MG/DL (ref 70–99)
HCT VFR BLD CALC: 38.7 % (ref 37–47)
HEMOGLOBIN: 11.9 GM/DL (ref 12.5–16)
IRON: 45 UG/DL (ref 37–145)
LYMPHOCYTES ABSOLUTE: 1.7 K/CU MM
LYMPHOCYTES RELATIVE PERCENT: 25.2 % (ref 24–44)
MCH RBC QN AUTO: 26.7 PG (ref 27–31)
MCHC RBC AUTO-ENTMCNC: 30.7 % (ref 32–36)
MCV RBC AUTO: 86.8 FL (ref 78–100)
MONOCYTES ABSOLUTE: 0.9 K/CU MM
MONOCYTES RELATIVE PERCENT: 13.6 % (ref 0–4)
PCT TRANSFERRIN: 10 % (ref 10–44)
PDW BLD-RTO: 15.4 % (ref 11.7–14.9)
PLATELET # BLD: 136 K/CU MM (ref 140–440)
PMV BLD AUTO: 11.4 FL (ref 7.5–11.1)
POTASSIUM SERPL-SCNC: 4 MMOL/L (ref 3.5–5.1)
RBC # BLD: 4.46 M/CU MM (ref 4.2–5.4)
SEGMENTED NEUTROPHILS ABSOLUTE COUNT: 3.9 K/CU MM
SEGMENTED NEUTROPHILS RELATIVE PERCENT: 56.8 % (ref 36–66)
SODIUM BLD-SCNC: 136 MMOL/L (ref 135–145)
TOTAL IRON BINDING CAPACITY: 432 UG/DL (ref 250–450)
TOTAL PROTEIN: 7.1 GM/DL (ref 6.4–8.2)
UNSATURATED IRON BINDING CAPACITY: 387 UG/DL (ref 110–370)
VITAMIN B-12: 570.7 PG/ML (ref 211–911)
WBC # BLD: 6.9 K/CU MM (ref 4–10.5)

## 2022-11-22 PROCEDURE — 36415 COLL VENOUS BLD VENIPUNCTURE: CPT

## 2022-11-22 PROCEDURE — 85025 COMPLETE CBC W/AUTO DIFF WBC: CPT

## 2022-11-22 PROCEDURE — 80053 COMPREHEN METABOLIC PANEL: CPT

## 2022-11-22 PROCEDURE — 82607 VITAMIN B-12: CPT

## 2022-11-22 PROCEDURE — 82728 ASSAY OF FERRITIN: CPT

## 2022-11-22 PROCEDURE — 83550 IRON BINDING TEST: CPT

## 2022-11-22 PROCEDURE — 82746 ASSAY OF FOLIC ACID SERUM: CPT

## 2022-11-22 PROCEDURE — 83540 ASSAY OF IRON: CPT

## 2022-11-30 ENCOUNTER — HOSPITAL ENCOUNTER (OUTPATIENT)
Dept: INFUSION THERAPY | Age: 77
Discharge: HOME | End: 2022-11-30
Payer: MEDICARE

## 2022-11-30 ENCOUNTER — OFFICE VISIT (OUTPATIENT)
Dept: ONCOLOGY | Age: 77
End: 2022-11-30
Payer: MEDICARE

## 2022-11-30 DIAGNOSIS — K74.60 CIRRHOSIS OF LIVER WITHOUT ASCITES, UNSPECIFIED HEPATIC CIRRHOSIS TYPE (HCC): ICD-10-CM

## 2022-11-30 DIAGNOSIS — D50.8 OTHER IRON DEFICIENCY ANEMIAS: ICD-10-CM

## 2022-11-30 DIAGNOSIS — D69.6 THROMBOCYTOPENIA, UNSPECIFIED (HCC): Primary | ICD-10-CM

## 2022-11-30 DIAGNOSIS — K90.9 INTESTINAL MALABSORPTION, UNSPECIFIED TYPE: ICD-10-CM

## 2022-11-30 PROCEDURE — 1090F PRES/ABSN URINE INCON ASSESS: CPT | Performed by: INTERNAL MEDICINE

## 2022-11-30 PROCEDURE — 3017F COLORECTAL CA SCREEN DOC REV: CPT | Performed by: INTERNAL MEDICINE

## 2022-11-30 PROCEDURE — 1123F ACP DISCUSS/DSCN MKR DOCD: CPT | Performed by: INTERNAL MEDICINE

## 2022-11-30 PROCEDURE — G8400 PT W/DXA NO RESULTS DOC: HCPCS | Performed by: INTERNAL MEDICINE

## 2022-11-30 PROCEDURE — 99214 OFFICE O/P EST MOD 30 MIN: CPT | Performed by: INTERNAL MEDICINE

## 2022-11-30 PROCEDURE — 99211 OFF/OP EST MAY X REQ PHY/QHP: CPT

## 2022-11-30 PROCEDURE — G8484 FLU IMMUNIZE NO ADMIN: HCPCS | Performed by: INTERNAL MEDICINE

## 2022-11-30 PROCEDURE — 1036F TOBACCO NON-USER: CPT | Performed by: INTERNAL MEDICINE

## 2022-11-30 PROCEDURE — G8427 DOCREV CUR MEDS BY ELIG CLIN: HCPCS | Performed by: INTERNAL MEDICINE

## 2022-11-30 PROCEDURE — G8417 CALC BMI ABV UP PARAM F/U: HCPCS | Performed by: INTERNAL MEDICINE

## 2022-11-30 NOTE — PROGRESS NOTES
Patient Name: Antonio Vigil  Patient : 1945  Patient MRN: [id number]     Primary Oncologist: Natalia Horton MD  Referring Physician: Darryle Mccreedy, Larence Part, MD   GI: Dr Cassie Camarena       Date of Service: 2022      Chief Complaint:   Chief Complaint   Patient presents with    Follow-up        Active Problem list  1. Thrombocytopenia, unspecified ([facility] [address]    2. Other iron deficiency anemias    3. Intestinal malabsorption, unspecified type    4. Cirrhosis of liver without ascites, unspecified hepatic cirrhosis type ([facility] [address]           HPI:        Initial HPI: 2017:  72 YOWF arrived alone. Reported increased fatigue for the past year. Reported that she falls asleep easily even at work. Friday is the last day of work. Denied any overt bleeding. Denied any chest pain, sob. Denied any PICA sx. Reported RUQ yesterday all day, was told that she had /gall bladder disease. Reported that she has been placed on oral iron supplements may 2017 but reports diarrhea associated with oral iron. Denied any fever, drenching night sweats or any weight loss. Reported dark stools when she takes tab. BP at home 190/80. Denies any Gu sx  ferritin 13, sats 10%    2017:Received parental iron and placed on oral iron supplementation. 17:Today in the clinic she arrived alone  -she reports that she feels more energetic after the iron infusion   -reports that she continues to have rt sided abdominal pain  -reports weight loss of about 4 lbs in the past month or so  -denies any overt bleeding  -denies any chest pain, sob  -reports uri sx  -denies any fever    3/22/18:Arrived alone to the clinic. Reported that her energy levels are much better after the iron infusion. Was sick with bronchitis  and 2018. Denied any fever, night sweats. Reported 24 lb weight loss as she walking more and also watching her diet. Has gained 4 lbs. Reported nose bleed when she had the sinus infection but no other overt bleeding.  Reported eczematous rash. Denied any chest pain, increased sob, cough, palpitation. Reported that she continues to have RUQ pain, has follow up appt with GI.     C-scope may 2015 with internal hemmorrhoids and diverticulosis    C scope 4/30/2018 with tubular adenoma of the sigmoid colon tubular adenoma of the transverse colon and diverticulosis of the sigmoid colon and descending colon. Hemorrhoids grade 2. EGD 6/25/2018 with hiatal hernia, dilated gastric stenosis, nonbleeding gastric ulcer, chronic gastritis. 11/13/18: Ct chest, abdomen and pelvis:IMPRESSION: No findings suspicious for malignancy within the chest, abdomen or pelvis. Hepatosplenomegaly with nodular liver contour and hepatic steatosis. Additional features suggesting some degree of portal venous hypertension. No abdominal ascites. 9/2019: Received parental iron    9/2019 CT scan of the chest showed small mediastinal lymph nodes. No lung nodules. Old granulomatous disease    2/17/2020 iron saturation of 20% folate of more than 20 ferritin 283 CMP with the BUN of 16 creatinine 0.7 albumin of 4.3 B12 688 CBC with WBC of 9.6 hemoglobin of 13.6 hematocrit 40.3 MCV of 93.5 and platelets 395    6/2/6984:JOK:  Summary    No EKG changes suggestive of ischemia with Lexiscan infusion. Normal perfusion uptake noted    no reversible ischemia noted    gating shows EF 71%     July 2022: US abdomen:  1. Mild diffuse hepatic steatosis. Mild hepatomegaly. 2. Normal gallbladder. Normal common bile duct at 5.5 mm.   3. Normal right kidney and pancreas. 4. No upper abdominal ascites. PMH:  DM  HTN  HLP  CAD  Polymyalgia rheumatica    PSH:   Hysterectomy  Foot surgeries    Allergies:  NKDA    SH:   Lives with son  No etoh/tob or any other illicit drug abuse    FH: NC    Interval History  11/30/2022: Arrived alone to the clinic today. Reported that lately she has been more tired and has been sleeping a lot. Taking oral iron OD. No bleeding. No PICA sx. No chest pain. No increased sob, palpitations. No dizziness. No fever. No cough. No abdominal pain. No diarrhea or any constipation. No weight loss recently. No  sx.     Review of Systems   Per interval history; otherwise 10 point ROS is negative              Vital Signs: There were no vitals taken for this visit. Physical Exam:    Constitutional: Aao x 3. Head/Face/Neck:ATNC, rt sterno clavicular prominence  Neck-Lymphatic: No lad Eyes: No icterus. No pallor. Mouth-Throat: mouth dry  Cardiovascular: S1S2, rrr, nsr, SM+.  Respiratory: CTABGI Exam: Soft nd nt bs pos Extremities: No LE edema bilaterally Neurology: GI.Skin: eczematous rash on the LE  Breast:could not palpate any breast mass or axillary lad      Labs:    Hematology:  Lab Results   Component Value Date    WBC 6.9 11/22/2022    RBC 4.46 11/22/2022    HGB 11.9 (L) 11/22/2022    HCT 38.7 11/22/2022    MCV 86.8 11/22/2022    MCH 26.7 (L) 11/22/2022    MCHC 30.7 (L) 11/22/2022    RDW 15.4 (H) 11/22/2022     (L) 11/22/2022    MPV 11.4 (H) 11/22/2022    BANDSPCT 12 (H) 11/08/2014    SEGSPCT 56.8 11/22/2022    EOSRELPCT 3.8 (H) 11/22/2022    BASOPCT 0.6 11/22/2022    LYMPHOPCT 25.2 11/22/2022    MONOPCT 13.6 (H) 11/22/2022    BANDABS 2.27 11/08/2014    SEGSABS 3.9 11/22/2022    EOSABS 0.3 11/22/2022    BASOSABS 0.0 11/22/2022    LYMPHSABS 1.7 11/22/2022    MONOSABS 0.9 11/22/2022    DIFFTYPE AUTOMATED DIFFERENTIAL 11/22/2022    POLYCHROM 1+ 11/08/2014    WBCMORP RARE 11/08/2014    PLTM SEVERAL LARGE PLATELETS 33/61/4960     No results found for: ESR    Chemistry:  Lab Results   Component Value Date     11/22/2022    K 4.0 11/22/2022    CL 98 (L) 11/22/2022    CO2 26 11/22/2022    BUN 15 11/22/2022    CREATININE 0.5 (L) 11/22/2022    GLUCOSE 235 (H) 11/22/2022    CALCIUM 10.6 11/22/2022    PROT 7.1 11/22/2022    LABALBU 4.4 11/22/2022    BILITOT 0.2 11/22/2022    ALKPHOS 47 11/22/2022    AST 37 11/22/2022    ALT 26 11/22/2022    LABGLOM >60 11/22/2022    GFRAA >60 06/21/2022    PHOS 3.3 04/08/2016    MG 1.7 (L) 11/17/2014    POCGLU 141 (H) 04/20/2016     Lab Results   Component Value Date     10/29/2018     No components found for: LD  No results found for: TSHHS, T4FREE, FT3    Immunology:  Lab Results   Component Value Date    PROT 7.1 11/22/2022     No results found for: Colan [place], KLFLCR  No results found for: B2M    Coagulation Panel:  Lab Results   Component Value Date    PROTIME 12.3 10/31/2018    INR 1.08 10/31/2018    APTT 35.3 (H) 10/31/2018    DDIMER >5250 (H) 10/29/2014       Anemia Panel:  Lab Results   Component Value Date    DBWFKJTB82 570.7 11/22/2022    FOLATE >20.0 (H) 11/22/2022       Tumor Markers:  No results found for: , CEA, , LABCA2, PSA      Imaging: Reviewed     Pathology:Reviewed     Observations:  Performance Status: ECOG 0  Depression Status: No data recorded          Assessment & Plan    Mild NC Anemia: iron indices/ferritin in the past  were suggestive of MEGHA. Received parental iron. No hemolysis, monoclonal gammopathy, other nutritional def. UA neg for blood. Anemia also probably contributed by aoe/aocd/ulcer, variceal bleed. Hb 11.9 and ferritin 21 in nov 2022, recommend oral iron eod and consider repeating IV iron if it does not improve. Recommend follow up with GI    Mild intermittent  thrombocytopenia: Reviewed meds. No evidence of hemolysis, nutritional def, ctd, RF. Most probably sec to splenic sequestration. cirrhosis: to follow with GI. Recent ultrasound in July 2022 with hepatic lesions/mass    Coagulopathy:sec to liver disease, Studies otherwise inconclusive    HTN:salt restriction diet control and exercise as tolerated. Maintain a log and discuss with PCP, may need adjustment of her antihypertensives if remains elevated    ? left breast mass: mammogram June 2022 with fatty tissue, recommend repeat mammogram in a year    Discussed the findings and the plan.  Pt verbalizes understanding    Discussed healthy lifestyle including regular exercise and weight loss. Also discussed the importance of being up-to-date with age-appropriate screening tools. Mammogram June 2022 normal   Due for colonoscopy, follow with GI    Recommend follow up with PCP and other specialists    Please do not hesitate to contact us if you need any further information.     RTC  march 2023 or earlier if new sx    BRENNAN

## 2022-11-30 NOTE — PROGRESS NOTES
MA Rooming Questions  Patient: Brian Chopra  MRN: [id number]    Date: 11/30/2022        1. Do you have any new issues?   no         2. Do you need any refills on medications?    no    3. Have you had any imaging done since your last visit?   no    4. Have you been hospitalized or seen in the emergency room since your last visit here?   no    5. Did the patient have a depression screening completed today?  No    No data recorded     PHQ-9 Given to (if applicable):               PHQ-9 Score (if applicable):                     [] Positive     []  Negative              Does question #9 need addressed (if applicable)                     [] Yes    []  No               Deni Klein CMA

## 2023-02-27 ENCOUNTER — HOSPITAL ENCOUNTER (OUTPATIENT)
Dept: INFUSION THERAPY | Age: 78
Discharge: HOME | End: 2023-02-27
Payer: MEDICARE

## 2023-02-27 DIAGNOSIS — D69.6 THROMBOCYTOPENIA, UNSPECIFIED (HCC): ICD-10-CM

## 2023-02-27 DIAGNOSIS — K74.60 CIRRHOSIS OF LIVER WITHOUT ASCITES, UNSPECIFIED HEPATIC CIRRHOSIS TYPE (HCC): ICD-10-CM

## 2023-02-27 DIAGNOSIS — D50.8 OTHER IRON DEFICIENCY ANEMIAS: ICD-10-CM

## 2023-02-27 DIAGNOSIS — K90.9 INTESTINAL MALABSORPTION, UNSPECIFIED TYPE: ICD-10-CM

## 2023-02-27 LAB
ALBUMIN SERPL-MCNC: 4.4 GM/DL (ref 3.4–5)
ALP BLD-CCNC: 45 IU/L (ref 40–129)
ALT SERPL-CCNC: 19 U/L (ref 10–40)
ANION GAP SERPL CALCULATED.3IONS-SCNC: 11 MMOL/L (ref 4–16)
AST SERPL-CCNC: 31 IU/L (ref 15–37)
BASOPHILS ABSOLUTE: 0.1 K/CU MM
BASOPHILS RELATIVE PERCENT: 0.6 % (ref 0–1)
BILIRUB SERPL-MCNC: 0.3 MG/DL (ref 0–1)
BUN SERPL-MCNC: 18 MG/DL (ref 6–23)
CALCIUM SERPL-MCNC: 10.4 MG/DL (ref 8.3–10.6)
CHLORIDE BLD-SCNC: 96 MMOL/L (ref 99–110)
CO2: 27 MMOL/L (ref 21–32)
CREAT SERPL-MCNC: 0.6 MG/DL (ref 0.6–1.1)
DIFFERENTIAL TYPE: ABNORMAL
EOSINOPHILS ABSOLUTE: 0.3 K/CU MM
EOSINOPHILS RELATIVE PERCENT: 3.5 % (ref 0–3)
FERRITIN: 22 NG/ML (ref 15–150)
GFR SERPL CREATININE-BSD FRML MDRD: >60 ML/MIN/1.73M2
GLUCOSE SERPL-MCNC: 194 MG/DL (ref 70–99)
HCT VFR BLD CALC: 38 % (ref 37–47)
HEMOGLOBIN: 12 GM/DL (ref 12.5–16)
IRON: 38 UG/DL (ref 37–145)
LYMPHOCYTES ABSOLUTE: 2 K/CU MM
LYMPHOCYTES RELATIVE PERCENT: 23.7 % (ref 24–44)
MCH RBC QN AUTO: 27 PG (ref 27–31)
MCHC RBC AUTO-ENTMCNC: 31.6 % (ref 32–36)
MCV RBC AUTO: 85.6 FL (ref 78–100)
MONOCYTES ABSOLUTE: 0.9 K/CU MM
MONOCYTES RELATIVE PERCENT: 10.9 % (ref 0–4)
PCT TRANSFERRIN: 9 % (ref 10–44)
PDW BLD-RTO: 16.1 % (ref 11.7–14.9)
PLATELET # BLD: 143 K/CU MM (ref 140–440)
PMV BLD AUTO: 10.8 FL (ref 7.5–11.1)
POTASSIUM SERPL-SCNC: 3.9 MMOL/L (ref 3.5–5.1)
RBC # BLD: 4.44 M/CU MM (ref 4.2–5.4)
SEGMENTED NEUTROPHILS ABSOLUTE COUNT: 5.3 K/CU MM
SEGMENTED NEUTROPHILS RELATIVE PERCENT: 61.3 % (ref 36–66)
SODIUM BLD-SCNC: 134 MMOL/L (ref 135–145)
TOTAL IRON BINDING CAPACITY: 423 UG/DL (ref 250–450)
TOTAL PROTEIN: 7.1 GM/DL (ref 6.4–8.2)
UNSATURATED IRON BINDING CAPACITY: 385 UG/DL (ref 110–370)
WBC # BLD: 8.6 K/CU MM (ref 4–10.5)

## 2023-02-27 PROCEDURE — 83550 IRON BINDING TEST: CPT

## 2023-02-27 PROCEDURE — 36415 COLL VENOUS BLD VENIPUNCTURE: CPT

## 2023-02-27 PROCEDURE — 80053 COMPREHEN METABOLIC PANEL: CPT

## 2023-02-27 PROCEDURE — 83540 ASSAY OF IRON: CPT

## 2023-02-27 PROCEDURE — 85025 COMPLETE CBC W/AUTO DIFF WBC: CPT

## 2023-02-27 PROCEDURE — 82728 ASSAY OF FERRITIN: CPT

## 2023-03-06 ENCOUNTER — OFFICE VISIT (OUTPATIENT)
Dept: ONCOLOGY | Age: 78
End: 2023-03-06
Payer: MEDICARE

## 2023-03-06 ENCOUNTER — HOSPITAL ENCOUNTER (OUTPATIENT)
Dept: INFUSION THERAPY | Age: 78
Discharge: HOME | End: 2023-03-06
Payer: MEDICARE

## 2023-03-06 VITALS
SYSTOLIC BLOOD PRESSURE: 139 MMHG | HEIGHT: 62 IN | TEMPERATURE: 97.6 F | DIASTOLIC BLOOD PRESSURE: 66 MMHG | BODY MASS INDEX: 28.01 KG/M2 | WEIGHT: 152.2 LBS | OXYGEN SATURATION: 92 % | HEART RATE: 80 BPM

## 2023-03-06 DIAGNOSIS — D69.6 THROMBOCYTOPENIA, UNSPECIFIED (HCC): Primary | ICD-10-CM

## 2023-03-06 DIAGNOSIS — K74.60 CIRRHOSIS OF LIVER WITHOUT ASCITES, UNSPECIFIED HEPATIC CIRRHOSIS TYPE (HCC): ICD-10-CM

## 2023-03-06 DIAGNOSIS — Z12.31 OTHER SCREENING MAMMOGRAM: ICD-10-CM

## 2023-03-06 DIAGNOSIS — D50.8 OTHER IRON DEFICIENCY ANEMIAS: ICD-10-CM

## 2023-03-06 DIAGNOSIS — K90.9 INTESTINAL MALABSORPTION, UNSPECIFIED TYPE: ICD-10-CM

## 2023-03-06 PROCEDURE — 3075F SYST BP GE 130 - 139MM HG: CPT | Performed by: INTERNAL MEDICINE

## 2023-03-06 PROCEDURE — 99214 OFFICE O/P EST MOD 30 MIN: CPT | Performed by: INTERNAL MEDICINE

## 2023-03-06 PROCEDURE — 3078F DIAST BP <80 MM HG: CPT | Performed by: INTERNAL MEDICINE

## 2023-03-06 PROCEDURE — 1123F ACP DISCUSS/DSCN MKR DOCD: CPT | Performed by: INTERNAL MEDICINE

## 2023-03-06 PROCEDURE — 99211 OFF/OP EST MAY X REQ PHY/QHP: CPT

## 2023-03-06 ASSESSMENT — PATIENT HEALTH QUESTIONNAIRE - PHQ9
2. FEELING DOWN, DEPRESSED OR HOPELESS: 0
SUM OF ALL RESPONSES TO PHQ QUESTIONS 1-9: 0
SUM OF ALL RESPONSES TO PHQ QUESTIONS 1-9: 0
1. LITTLE INTEREST OR PLEASURE IN DOING THINGS: 0
SUM OF ALL RESPONSES TO PHQ QUESTIONS 1-9: 0
SUM OF ALL RESPONSES TO PHQ QUESTIONS 1-9: 0
SUM OF ALL RESPONSES TO PHQ9 QUESTIONS 1 & 2: 0

## 2023-03-06 NOTE — PROGRESS NOTES
Patient Name: Aspen Henley  Patient : 1945  Patient MRN: [id number]     Primary Oncologist: Ruthann Newman MD  Referring Physician: SYMONE ANAYA, ZULEYKA CHILDRESS MD   GI: Dr Newman       Date of Service: 3/6/2023      Chief Complaint:   Chief Complaint   Patient presents with    Follow-up        Active Problem list  1. Thrombocytopenia, unspecified ([facility])    2. Other iron deficiency anemias    3. Intestinal malabsorption, unspecified type    4. Cirrhosis of liver without ascites, unspecified hepatic cirrhosis type ([facility])           HPI:        Initial HPI: 2017:  72 YOWF arrived alone. Reported increased fatigue for the past year. Reported that she falls asleep easily even at work. Friday is the last day of work. Denied any overt bleeding. Denied any chest pain, sob. Denied any PICA sx. Reported RUQ yesterday all day, was told that she had /gall bladder disease.   Reported that she has been placed on oral iron supplements may 2017 but reports diarrhea associated with oral iron. Denied any fever, drenching night sweats or any weight loss. Reported dark stools when she takes tab. BP at home 190/80. Denies any Gu sx  ferritin 13, sats 10%    2017:Received parental iron and placed on oral iron supplementation.    17:Today in the clinic she arrived alone  -she reports that she feels more energetic after the iron infusion   -reports that she continues to have rt sided abdominal pain  -reports weight loss of about 4 lbs in the past month or so  -denies any overt bleeding  -denies any chest pain, sob  -reports uri sx  -denies any fever    3/22/18:Arrived alone to the clinic. Reported that her energy levels are much better after the iron infusion. Was sick with bronchitis  and 2018. Denied any fever, night sweats. Reported 24 lb weight loss as she walking more and also watching her diet. Has gained 4 lbs. Reported nose bleed when she had the sinus infection but no other overt bleeding. Reported  eczematous rash. Denied any chest pain, increased sob, cough, palpitation. Reported that she continues to have RUQ pain, has follow up appt with GI.     C-scope may 2015 with internal hemmorrhoids and diverticulosis    C scope 4/30/2018 with tubular adenoma of the sigmoid colon tubular adenoma of the transverse colon and diverticulosis of the sigmoid colon and descending colon. Hemorrhoids grade 2. EGD 6/25/2018 with hiatal hernia, dilated gastric stenosis, nonbleeding gastric ulcer, chronic gastritis. 11/13/18: Ct chest, abdomen and pelvis:IMPRESSION: No findings suspicious for malignancy within the chest, abdomen or pelvis. Hepatosplenomegaly with nodular liver contour and hepatic steatosis. Additional features suggesting some degree of portal venous hypertension. No abdominal ascites. 9/2019: Received parental iron    9/2019 CT scan of the chest showed small mediastinal lymph nodes. No lung nodules. Old granulomatous disease    2/17/2020 iron saturation of 20% folate of more than 20 ferritin 283 CMP with the BUN of 16 creatinine 0.7 albumin of 4.3 B12 688 CBC with WBC of 9.6 hemoglobin of 13.6 hematocrit 40.3 MCV of 93.5 and platelets 379    2/8/7051:WZX:  Summary    No EKG changes suggestive of ischemia with Lexiscan infusion. Normal perfusion uptake noted    no reversible ischemia noted    gating shows EF 71%     July 2022, mammogram with no evidence of any malignancy, BI-RADS Category 2    July 2022: US abdomen:  1. Mild diffuse hepatic steatosis. Mild hepatomegaly. 2. Normal gallbladder. Normal common bile duct at 5.5 mm.   3. Normal right kidney and pancreas. 4. No upper abdominal ascites.          2/27/2023 iron saturations of 9%Ferritin of 22 CMP with sodium of 134 potassium of 3.9 BUN of 18 creatinine 0.6 glucose 194 otherwise within normal limits CBC with hemoglobin of 12 hematocrit 38 WBC of 8.6 MCV of 85.6 and platelets of 314    PMH:  DM  HTN  HLP  CAD  Polymyalgia rheumatica    PSH: Hysterectomy  Foot surgeries    Allergies:  NKDA    SH:   Lives with son  No etoh/tob or any other illicit drug abuse    FH: NC    Interval History  3/6/2023: Arrived alone to the clinic today. Overall Feeling better. Taking Oral Iron three times a week. Diarrhea eased off after starting iron. No Bleeding. No pica symptoms. No CP. No ABD Pain. No increased SOB. Weight stable. Review of Systems   Per interval history; otherwise 10 point ROS is negative              Vital Signs:  /66 (Site: Right Upper Arm, Position: Sitting, Cuff Size: Medium Adult)   Pulse 80   Temp 97.6 °F (36.4 °C) (Infrared)   Ht 5' 2\" (1.575 m)   Wt 152 lb 3.2 oz (69 kg)   SpO2 92%   BMI 27.84 kg/m²     Physical Exam:    Constitutional: Aao x 3. Head/Face/Neck:ATNC, rt sterno clavicular prominence  Neck-Lymphatic: No lad Eyes: No icterus. No pallor. Mouth-Throat: mouth dry  Cardiovascular: S1S2, rrr, nsr, SM+.  Respiratory: CTABGI Exam: Soft nd nt bs pos Extremities: No LE edema bilaterally Neurology: GI.Skin: eczematous rash on the LE  Breast:could not palpate any breast mass or axillary lad      Labs:    Hematology:  Lab Results   Component Value Date    WBC 8.6 02/27/2023    RBC 4.44 02/27/2023    HGB 12.0 (L) 02/27/2023    HCT 38.0 02/27/2023    MCV 85.6 02/27/2023    MCH 27.0 02/27/2023    MCHC 31.6 (L) 02/27/2023    RDW 16.1 (H) 02/27/2023     02/27/2023    MPV 10.8 02/27/2023    BANDSPCT 12 (H) 11/08/2014    SEGSPCT 61.3 02/27/2023    EOSRELPCT 3.5 (H) 02/27/2023    BASOPCT 0.6 02/27/2023    LYMPHOPCT 23.7 (L) 02/27/2023    MONOPCT 10.9 (H) 02/27/2023    BANDABS 2.27 11/08/2014    SEGSABS 5.3 02/27/2023    EOSABS 0.3 02/27/2023    BASOSABS 0.1 02/27/2023    LYMPHSABS 2.0 02/27/2023    MONOSABS 0.9 02/27/2023    DIFFTYPE AUTOMATED DIFFERENTIAL 02/27/2023    POLYCHROM 1+ 11/08/2014    WBCMORP RARE 11/08/2014    PLTM SEVERAL LARGE PLATELETS 27/30/5574     No results found for: ESR    Chemistry:  Lab Results Component Value Date     (L) 02/27/2023    K 3.9 02/27/2023    CL 96 (L) 02/27/2023    CO2 27 02/27/2023    BUN 18 02/27/2023    CREATININE 0.6 02/27/2023    GLUCOSE 194 (H) 02/27/2023    CALCIUM 10.4 02/27/2023    PROT 7.1 02/27/2023    LABALBU 4.4 02/27/2023    BILITOT 0.3 02/27/2023    ALKPHOS 45 02/27/2023    AST 31 02/27/2023    ALT 19 02/27/2023    LABGLOM >60 02/27/2023    GFRAA >60 06/21/2022    PHOS 3.3 04/08/2016    MG 1.7 (L) 11/17/2014    POCGLU 141 (H) 04/20/2016     Lab Results   Component Value Date     10/29/2018     No components found for: LD  No results found for: TSHHS, T4FREE, FT3    Immunology:  Lab Results   Component Value Date    PROT 7.1 02/27/2023     No results found for: Magali Perdue, KLFLCR  No results found for: B2M    Coagulation Panel:  Lab Results   Component Value Date    PROTIME 12.3 10/31/2018    INR 1.08 10/31/2018    APTT 35.3 (H) 10/31/2018    DDIMER >5250 (H) 10/29/2014       Anemia Panel:  Lab Results   Component Value Date    [id number] 570.7 11/22/2022    FOLATE >20.0 (H) 11/22/2022       Tumor Markers:  No results found for: , CEA, , LABCA2, PSA      Imaging: Reviewed     Pathology:Reviewed     Observations:  Performance Status: ECOG 0  Depression Status: PHQ-9 Total Score: 0 (3/6/2023  3:16 PM)          Assessment & Plan    Mild NC Anemia: iron indices/ferritin in the past  were suggestive of MEGHA. Received parental iron. No hemolysis, monoclonal gammopathy, other nutritional def. UA neg for blood. Anemia also probably contributed by aoe/aocd/ulcer, variceal bleed. Hb 12.0 and ferritin 22 in feb 2023, recommend oral iron eod. Recommend follow up with GI    Mild intermittent  thrombocytopenia: Reviewed meds. No evidence of hemolysis, nutritional def, ctd, RF. Most probably sec to splenic sequestration. Platelet in Feb 4084  was 143. ELLIS/cirrhosis: to follow with GI. Recent ultrasound in July 2022 with no hepatic lesions/mass.  Recommend follow up with GI. HTN:salt restriction diet control and exercise as tolerated. Maintain a log and discuss with PCP, may need adjustment of her antihypertensives if remains elevated    ? left breast mass: mammogram June 2022 with fatty tissue, recommend repeat mammogram in a year, repeat Mammogram ordered for June 2023. Discussed the findings and the plan. Pt verbalizes understanding    Discussed healthy lifestyle including regular exercise and weight loss. Also discussed the importance of being up-to-date with age-appropriate screening tools. Mammogram June 2022 normal   Due for colonoscopy, follow with GI    Recommend follow up with PCP and other specialists    Please do not hesitate to contact us if you need any further information.     RTC  June 2023 or earlier if new sx    BRENNAN

## 2023-03-06 NOTE — PROGRESS NOTES
MA Rooming Questions  Patient: Bienvenido Ascencio  MRN: [id number]    Date: 3/6/2023        1. Do you have any new issues?   no         2. Do you need any refills on medications?    no    3. Have you had any imaging done since your last visit?   no    4. Have you been hospitalized or seen in the emergency room since your last visit here?   no    5. Did the patient have a depression screening completed today?  Yes    PHQ-9 Total Score: 0 (3/6/2023  3:16 PM)       PHQ-9 Given to (if applicable):               PHQ-9 Score (if applicable):                     [] Positive     [x]  Negative              Does question #9 need addressed (if applicable)                     [] Yes    []  No               Rosalina Alcantar CMA

## 2023-06-21 ENCOUNTER — HOSPITAL ENCOUNTER (OUTPATIENT)
Dept: INFUSION THERAPY | Age: 78
Discharge: HOME | End: 2023-06-21
Payer: MEDICARE

## 2023-06-21 DIAGNOSIS — K90.9 INTESTINAL MALABSORPTION, UNSPECIFIED TYPE: ICD-10-CM

## 2023-06-21 DIAGNOSIS — D69.6 THROMBOCYTOPENIA, UNSPECIFIED (HCC): ICD-10-CM

## 2023-06-21 DIAGNOSIS — D50.8 OTHER IRON DEFICIENCY ANEMIAS: ICD-10-CM

## 2023-06-21 DIAGNOSIS — K74.60 CIRRHOSIS OF LIVER WITHOUT ASCITES, UNSPECIFIED HEPATIC CIRRHOSIS TYPE (HCC): ICD-10-CM

## 2023-06-21 LAB
ALBUMIN SERPL-MCNC: 4.5 GM/DL (ref 3.4–5)
ALP BLD-CCNC: 47 IU/L (ref 40–129)
ALT SERPL-CCNC: 19 U/L (ref 10–40)
ANION GAP SERPL CALCULATED.3IONS-SCNC: 15 MMOL/L (ref 4–16)
AST SERPL-CCNC: 29 IU/L (ref 15–37)
BASOPHILS ABSOLUTE: 0.1 K/CU MM
BASOPHILS RELATIVE PERCENT: 0.7 % (ref 0–1)
BILIRUB SERPL-MCNC: 0.2 MG/DL (ref 0–1)
BUN SERPL-MCNC: 11 MG/DL (ref 6–23)
CALCIUM SERPL-MCNC: 9.4 MG/DL (ref 8.3–10.6)
CHLORIDE BLD-SCNC: 100 MMOL/L (ref 99–110)
CO2: 24 MMOL/L (ref 21–32)
CREAT SERPL-MCNC: 0.6 MG/DL (ref 0.6–1.1)
DIFFERENTIAL TYPE: ABNORMAL
EOSINOPHILS ABSOLUTE: 0.2 K/CU MM
EOSINOPHILS RELATIVE PERCENT: 2.6 % (ref 0–3)
FERRITIN: 19 NG/ML (ref 15–150)
FOLATE SERPL-MCNC: >20 NG/ML (ref 3.1–17.5)
GFR SERPL CREATININE-BSD FRML MDRD: >60 ML/MIN/1.73M2
GLUCOSE SERPL-MCNC: 219 MG/DL (ref 70–99)
HCT VFR BLD CALC: 39 % (ref 37–47)
HEMOGLOBIN: 12.2 GM/DL (ref 12.5–16)
IRON: 33 UG/DL (ref 37–145)
LYMPHOCYTES ABSOLUTE: 1.4 K/CU MM
LYMPHOCYTES RELATIVE PERCENT: 17 % (ref 24–44)
MCH RBC QN AUTO: 28 PG (ref 27–31)
MCHC RBC AUTO-ENTMCNC: 31.3 % (ref 32–36)
MCV RBC AUTO: 89.7 FL (ref 78–100)
MONOCYTES ABSOLUTE: 0.9 K/CU MM
MONOCYTES RELATIVE PERCENT: 10.4 % (ref 0–4)
PCT TRANSFERRIN: 8 % (ref 10–44)
PDW BLD-RTO: 15.4 % (ref 11.7–14.9)
PLATELET # BLD: 137 K/CU MM (ref 140–440)
PMV BLD AUTO: 11.2 FL (ref 7.5–11.1)
POTASSIUM SERPL-SCNC: 3.7 MMOL/L (ref 3.5–5.1)
RBC # BLD: 4.35 M/CU MM (ref 4.2–5.4)
SEGMENTED NEUTROPHILS ABSOLUTE COUNT: 5.8 K/CU MM
SEGMENTED NEUTROPHILS RELATIVE PERCENT: 69.3 % (ref 36–66)
SODIUM BLD-SCNC: 139 MMOL/L (ref 135–145)
TOTAL IRON BINDING CAPACITY: 435 UG/DL (ref 250–450)
TOTAL PROTEIN: 7.2 GM/DL (ref 6.4–8.2)
UNSATURATED IRON BINDING CAPACITY: 402 UG/DL (ref 110–370)
VITAMIN B-12: 499.2 PG/ML (ref 211–911)
WBC # BLD: 8.4 K/CU MM (ref 4–10.5)

## 2023-06-21 PROCEDURE — 83540 ASSAY OF IRON: CPT

## 2023-06-21 PROCEDURE — 85025 COMPLETE CBC W/AUTO DIFF WBC: CPT

## 2023-06-21 PROCEDURE — 83550 IRON BINDING TEST: CPT

## 2023-06-21 PROCEDURE — 82728 ASSAY OF FERRITIN: CPT

## 2023-06-21 PROCEDURE — 82746 ASSAY OF FOLIC ACID SERUM: CPT

## 2023-06-21 PROCEDURE — 82607 VITAMIN B-12: CPT

## 2023-06-21 PROCEDURE — 80053 COMPREHEN METABOLIC PANEL: CPT

## 2023-06-21 PROCEDURE — 36415 COLL VENOUS BLD VENIPUNCTURE: CPT

## 2023-06-28 ENCOUNTER — OFFICE VISIT (OUTPATIENT)
Dept: ONCOLOGY | Age: 78
End: 2023-06-28
Payer: MEDICARE

## 2023-06-28 ENCOUNTER — HOSPITAL ENCOUNTER (OUTPATIENT)
Dept: INFUSION THERAPY | Age: 78
Discharge: HOME | End: 2023-06-28
Payer: MEDICARE

## 2023-06-28 VITALS
HEIGHT: 62 IN | RESPIRATION RATE: 16 BRPM | DIASTOLIC BLOOD PRESSURE: 84 MMHG | OXYGEN SATURATION: 95 % | HEART RATE: 89 BPM | TEMPERATURE: 97.6 F | WEIGHT: 152.8 LBS | SYSTOLIC BLOOD PRESSURE: 193 MMHG | BODY MASS INDEX: 28.12 KG/M2

## 2023-06-28 DIAGNOSIS — K74.60 CIRRHOSIS OF LIVER WITHOUT ASCITES, UNSPECIFIED HEPATIC CIRRHOSIS TYPE (HCC): ICD-10-CM

## 2023-06-28 DIAGNOSIS — D69.6 THROMBOCYTOPENIA, UNSPECIFIED (HCC): Primary | ICD-10-CM

## 2023-06-28 DIAGNOSIS — D50.8 OTHER IRON DEFICIENCY ANEMIAS: ICD-10-CM

## 2023-06-28 DIAGNOSIS — K90.9 INTESTINAL MALABSORPTION, UNSPECIFIED TYPE: ICD-10-CM

## 2023-06-28 DIAGNOSIS — Z12.31 OTHER SCREENING MAMMOGRAM: ICD-10-CM

## 2023-06-28 DIAGNOSIS — D50.9 IRON DEFICIENCY ANEMIA, UNSPECIFIED IRON DEFICIENCY ANEMIA TYPE: ICD-10-CM

## 2023-06-28 PROCEDURE — 1123F ACP DISCUSS/DSCN MKR DOCD: CPT | Performed by: INTERNAL MEDICINE

## 2023-06-28 PROCEDURE — 99214 OFFICE O/P EST MOD 30 MIN: CPT | Performed by: INTERNAL MEDICINE

## 2023-06-28 PROCEDURE — 3079F DIAST BP 80-89 MM HG: CPT | Performed by: INTERNAL MEDICINE

## 2023-06-28 PROCEDURE — 99211 OFF/OP EST MAY X REQ PHY/QHP: CPT

## 2023-06-28 PROCEDURE — 3077F SYST BP >= 140 MM HG: CPT | Performed by: INTERNAL MEDICINE

## 2023-06-28 RX ORDER — EPINEPHRINE 1 MG/ML
0.3 INJECTION, SOLUTION, CONCENTRATE INTRAVENOUS PRN
OUTPATIENT
Start: 2023-06-28

## 2023-06-28 RX ORDER — FAMOTIDINE 10 MG/ML
20 INJECTION, SOLUTION INTRAVENOUS
OUTPATIENT
Start: 2023-06-28

## 2023-06-28 RX ORDER — ACETAMINOPHEN 325 MG/1
650 TABLET ORAL
OUTPATIENT
Start: 2023-06-28

## 2023-06-28 RX ORDER — SODIUM CHLORIDE 9 MG/ML
INJECTION, SOLUTION INTRAVENOUS CONTINUOUS
OUTPATIENT
Start: 2023-06-28

## 2023-06-28 RX ORDER — SODIUM CHLORIDE 9 MG/ML
5-250 INJECTION, SOLUTION INTRAVENOUS PRN
OUTPATIENT
Start: 2023-06-28

## 2023-06-28 RX ORDER — DIPHENHYDRAMINE HYDROCHLORIDE 50 MG/ML
50 INJECTION INTRAMUSCULAR; INTRAVENOUS
OUTPATIENT
Start: 2023-06-28

## 2023-06-28 RX ORDER — SODIUM CHLORIDE 0.9 % (FLUSH) 0.9 %
5-40 SYRINGE (ML) INJECTION PRN
OUTPATIENT
Start: 2023-06-28

## 2023-06-28 RX ORDER — ALBUTEROL SULFATE 90 UG/1
4 AEROSOL, METERED RESPIRATORY (INHALATION) PRN
OUTPATIENT
Start: 2023-06-28

## 2023-06-28 RX ORDER — HEPARIN SODIUM (PORCINE) LOCK FLUSH IV SOLN 100 UNIT/ML 100 UNIT/ML
500 SOLUTION INTRAVENOUS PRN
OUTPATIENT
Start: 2023-06-28

## 2023-06-28 RX ORDER — ONDANSETRON 2 MG/ML
8 INJECTION INTRAMUSCULAR; INTRAVENOUS
OUTPATIENT
Start: 2023-06-28

## 2023-07-07 ENCOUNTER — TELEPHONE (OUTPATIENT)
Dept: INFUSION THERAPY | Age: 78
End: 2023-07-07

## 2023-07-07 NOTE — TELEPHONE ENCOUNTER
Called patient to schedule iron infusion, patient is scheduled for appt. Advised pt to stop oral iron one day before infusion start date and can resume after iron infusions are complete. Advised pt to notify us if they are unable to keep their scheduled appt time.

## 2023-07-11 ENCOUNTER — HOSPITAL ENCOUNTER (OUTPATIENT)
Dept: INFUSION THERAPY | Age: 78
Discharge: HOME | End: 2023-07-11
Payer: MEDICARE

## 2023-07-11 VITALS
DIASTOLIC BLOOD PRESSURE: 72 MMHG | HEIGHT: 62 IN | WEIGHT: 151.6 LBS | OXYGEN SATURATION: 92 % | HEART RATE: 76 BPM | RESPIRATION RATE: 16 BRPM | SYSTOLIC BLOOD PRESSURE: 169 MMHG | TEMPERATURE: 97.7 F | BODY MASS INDEX: 27.9 KG/M2

## 2023-07-11 DIAGNOSIS — D50.9 IRON DEFICIENCY ANEMIA, UNSPECIFIED IRON DEFICIENCY ANEMIA TYPE: ICD-10-CM

## 2023-07-11 DIAGNOSIS — K90.9 INTESTINAL MALABSORPTION, UNSPECIFIED TYPE: Primary | ICD-10-CM

## 2023-07-11 PROCEDURE — 2580000003 HC RX 258: Performed by: INTERNAL MEDICINE

## 2023-07-11 PROCEDURE — 96365 THER/PROPH/DIAG IV INF INIT: CPT

## 2023-07-11 PROCEDURE — 96366 THER/PROPH/DIAG IV INF ADDON: CPT

## 2023-07-11 PROCEDURE — 6360000002 HC RX W HCPCS: Performed by: INTERNAL MEDICINE

## 2023-07-11 RX ORDER — ONDANSETRON 2 MG/ML
8 INJECTION INTRAMUSCULAR; INTRAVENOUS
Status: CANCELLED | OUTPATIENT
Start: 2023-07-18

## 2023-07-11 RX ORDER — SODIUM CHLORIDE 9 MG/ML
5-250 INJECTION, SOLUTION INTRAVENOUS PRN
Status: CANCELLED | OUTPATIENT
Start: 2023-07-18

## 2023-07-11 RX ORDER — FAMOTIDINE 10 MG/ML
20 INJECTION, SOLUTION INTRAVENOUS
Status: CANCELLED | OUTPATIENT
Start: 2023-07-18

## 2023-07-11 RX ORDER — EPINEPHRINE 1 MG/ML
0.3 INJECTION, SOLUTION, CONCENTRATE INTRAVENOUS PRN
Status: CANCELLED | OUTPATIENT
Start: 2023-07-18

## 2023-07-11 RX ORDER — SODIUM CHLORIDE 0.9 % (FLUSH) 0.9 %
5-40 SYRINGE (ML) INJECTION PRN
Status: DISCONTINUED | OUTPATIENT
Start: 2023-07-11 | End: 2023-07-12 | Stop reason: HOSPADM

## 2023-07-11 RX ORDER — SODIUM CHLORIDE 9 MG/ML
5-250 INJECTION, SOLUTION INTRAVENOUS PRN
Status: DISCONTINUED | OUTPATIENT
Start: 2023-07-11 | End: 2023-07-12 | Stop reason: HOSPADM

## 2023-07-11 RX ORDER — SODIUM CHLORIDE 0.9 % (FLUSH) 0.9 %
5-40 SYRINGE (ML) INJECTION PRN
Status: CANCELLED | OUTPATIENT
Start: 2023-07-18

## 2023-07-11 RX ORDER — DIPHENHYDRAMINE HYDROCHLORIDE 50 MG/ML
50 INJECTION INTRAMUSCULAR; INTRAVENOUS
Status: CANCELLED | OUTPATIENT
Start: 2023-07-18

## 2023-07-11 RX ORDER — SODIUM CHLORIDE 9 MG/ML
INJECTION, SOLUTION INTRAVENOUS CONTINUOUS
Status: CANCELLED | OUTPATIENT
Start: 2023-07-18

## 2023-07-11 RX ORDER — HEPARIN SODIUM 100 [USP'U]/ML
500 INJECTION, SOLUTION INTRAVENOUS PRN
Status: CANCELLED | OUTPATIENT
Start: 2023-07-18

## 2023-07-11 RX ORDER — ACETAMINOPHEN 325 MG/1
650 TABLET ORAL
Status: CANCELLED | OUTPATIENT
Start: 2023-07-18

## 2023-07-11 RX ORDER — ALBUTEROL SULFATE 90 UG/1
4 AEROSOL, METERED RESPIRATORY (INHALATION) PRN
Status: CANCELLED | OUTPATIENT
Start: 2023-07-18

## 2023-07-11 RX ADMIN — SODIUM CHLORIDE, PRESERVATIVE FREE 10 ML: 5 INJECTION INTRAVENOUS at 11:00

## 2023-07-11 RX ADMIN — IRON SUCROSE 300 MG: 20 INJECTION, SOLUTION INTRAVENOUS at 08:43

## 2023-07-11 RX ADMIN — SODIUM CHLORIDE 20 ML/HR: 9 INJECTION, SOLUTION INTRAVENOUS at 08:43

## 2023-07-11 NOTE — PROGRESS NOTES
Patient arrived to treatment suite for Venofer 1 of 3. PIV started in left arm and good blood return noted. Patient has no questions or concerns for the doctor at this time. Treatment approved and given. 30-minute post-infusion monitoring observed. Patient tolerated well. Left treatment suite ambulatory. Discharge instructions provided.

## 2023-07-18 ENCOUNTER — HOSPITAL ENCOUNTER (OUTPATIENT)
Dept: INFUSION THERAPY | Age: 78
Discharge: HOME | End: 2023-07-18
Payer: MEDICARE

## 2023-07-18 VITALS — OXYGEN SATURATION: 95 % | DIASTOLIC BLOOD PRESSURE: 61 MMHG | SYSTOLIC BLOOD PRESSURE: 131 MMHG | HEART RATE: 75 BPM

## 2023-07-18 DIAGNOSIS — K90.9 INTESTINAL MALABSORPTION, UNSPECIFIED TYPE: Primary | ICD-10-CM

## 2023-07-18 DIAGNOSIS — D50.9 IRON DEFICIENCY ANEMIA, UNSPECIFIED IRON DEFICIENCY ANEMIA TYPE: ICD-10-CM

## 2023-07-18 PROCEDURE — 2580000003 HC RX 258: Performed by: INTERNAL MEDICINE

## 2023-07-18 PROCEDURE — 96366 THER/PROPH/DIAG IV INF ADDON: CPT

## 2023-07-18 PROCEDURE — 6360000002 HC RX W HCPCS: Performed by: INTERNAL MEDICINE

## 2023-07-18 PROCEDURE — 96365 THER/PROPH/DIAG IV INF INIT: CPT

## 2023-07-18 RX ORDER — SODIUM CHLORIDE 9 MG/ML
5-250 INJECTION, SOLUTION INTRAVENOUS PRN
Status: CANCELLED | OUTPATIENT
Start: 2023-07-25

## 2023-07-18 RX ORDER — SODIUM CHLORIDE 9 MG/ML
5-250 INJECTION, SOLUTION INTRAVENOUS PRN
Status: DISCONTINUED | OUTPATIENT
Start: 2023-07-18 | End: 2023-07-19 | Stop reason: HOSPADM

## 2023-07-18 RX ORDER — ONDANSETRON 2 MG/ML
8 INJECTION INTRAMUSCULAR; INTRAVENOUS
Status: CANCELLED | OUTPATIENT
Start: 2023-07-25

## 2023-07-18 RX ORDER — SODIUM CHLORIDE 0.9 % (FLUSH) 0.9 %
5-40 SYRINGE (ML) INJECTION PRN
Status: CANCELLED | OUTPATIENT
Start: 2023-07-25

## 2023-07-18 RX ORDER — HEPARIN 100 UNIT/ML
500 SYRINGE INTRAVENOUS PRN
Status: CANCELLED | OUTPATIENT
Start: 2023-07-25

## 2023-07-18 RX ORDER — ACETAMINOPHEN 325 MG/1
650 TABLET ORAL
Status: CANCELLED | OUTPATIENT
Start: 2023-07-25

## 2023-07-18 RX ORDER — SODIUM CHLORIDE 9 MG/ML
INJECTION, SOLUTION INTRAVENOUS CONTINUOUS
Status: CANCELLED | OUTPATIENT
Start: 2023-07-25

## 2023-07-18 RX ORDER — EPINEPHRINE 1 MG/ML
0.3 INJECTION, SOLUTION, CONCENTRATE INTRAVENOUS PRN
Status: CANCELLED | OUTPATIENT
Start: 2023-07-25

## 2023-07-18 RX ORDER — DIPHENHYDRAMINE HYDROCHLORIDE 50 MG/ML
50 INJECTION INTRAMUSCULAR; INTRAVENOUS
Status: CANCELLED | OUTPATIENT
Start: 2023-07-25

## 2023-07-18 RX ORDER — ALBUTEROL SULFATE 90 UG/1
4 AEROSOL, METERED RESPIRATORY (INHALATION) PRN
Status: CANCELLED | OUTPATIENT
Start: 2023-07-25

## 2023-07-18 RX ORDER — SODIUM CHLORIDE 0.9 % (FLUSH) 0.9 %
5-40 SYRINGE (ML) INJECTION PRN
Status: DISCONTINUED | OUTPATIENT
Start: 2023-07-18 | End: 2023-07-19 | Stop reason: HOSPADM

## 2023-07-18 RX ORDER — FAMOTIDINE 10 MG/ML
20 INJECTION, SOLUTION INTRAVENOUS
Status: CANCELLED | OUTPATIENT
Start: 2023-07-25

## 2023-07-18 RX ADMIN — SODIUM CHLORIDE 20 ML/HR: 9 INJECTION, SOLUTION INTRAVENOUS at 11:22

## 2023-07-18 RX ADMIN — IRON SUCROSE 300 MG: 20 INJECTION, SOLUTION INTRAVENOUS at 11:22

## 2023-07-18 NOTE — PROGRESS NOTES
Patient arrived to treatment suite for Venofer 2 of 3. PIV started in left hand and good blood return noted. No changes from last treatment. Patient states still feeling tired. Treatment approved and given. 30-minute post-infusion monitoring observed. Patient tolerated well. Left treatment suite ambulatory. Discharge instructions declined.

## 2023-07-25 ENCOUNTER — HOSPITAL ENCOUNTER (OUTPATIENT)
Dept: INFUSION THERAPY | Age: 78
Discharge: HOME | End: 2023-07-25
Payer: MEDICARE

## 2023-07-25 VITALS
HEART RATE: 81 BPM | DIASTOLIC BLOOD PRESSURE: 77 MMHG | TEMPERATURE: 97.8 F | OXYGEN SATURATION: 94 % | SYSTOLIC BLOOD PRESSURE: 138 MMHG

## 2023-07-25 DIAGNOSIS — D50.9 IRON DEFICIENCY ANEMIA, UNSPECIFIED IRON DEFICIENCY ANEMIA TYPE: ICD-10-CM

## 2023-07-25 DIAGNOSIS — K90.9 INTESTINAL MALABSORPTION, UNSPECIFIED TYPE: Primary | ICD-10-CM

## 2023-07-25 PROCEDURE — 2580000003 HC RX 258: Performed by: INTERNAL MEDICINE

## 2023-07-25 PROCEDURE — 6360000002 HC RX W HCPCS: Performed by: INTERNAL MEDICINE

## 2023-07-25 PROCEDURE — 96365 THER/PROPH/DIAG IV INF INIT: CPT

## 2023-07-25 PROCEDURE — 96366 THER/PROPH/DIAG IV INF ADDON: CPT

## 2023-07-25 RX ORDER — HEPARIN 100 UNIT/ML
500 SYRINGE INTRAVENOUS PRN
OUTPATIENT
Start: 2023-07-25

## 2023-07-25 RX ORDER — SODIUM CHLORIDE 9 MG/ML
5-250 INJECTION, SOLUTION INTRAVENOUS PRN
Status: DISCONTINUED | OUTPATIENT
Start: 2023-07-25 | End: 2023-07-26 | Stop reason: HOSPADM

## 2023-07-25 RX ORDER — SODIUM CHLORIDE 0.9 % (FLUSH) 0.9 %
5-40 SYRINGE (ML) INJECTION PRN
OUTPATIENT
Start: 2023-07-25

## 2023-07-25 RX ORDER — SODIUM CHLORIDE 9 MG/ML
5-250 INJECTION, SOLUTION INTRAVENOUS PRN
OUTPATIENT
Start: 2023-07-25

## 2023-07-25 RX ORDER — FAMOTIDINE 10 MG/ML
20 INJECTION, SOLUTION INTRAVENOUS
OUTPATIENT
Start: 2023-07-25

## 2023-07-25 RX ORDER — ACETAMINOPHEN 325 MG/1
650 TABLET ORAL
OUTPATIENT
Start: 2023-07-25

## 2023-07-25 RX ORDER — EPINEPHRINE 1 MG/ML
0.3 INJECTION, SOLUTION, CONCENTRATE INTRAVENOUS PRN
OUTPATIENT
Start: 2023-07-25

## 2023-07-25 RX ORDER — ALBUTEROL SULFATE 90 UG/1
4 AEROSOL, METERED RESPIRATORY (INHALATION) PRN
OUTPATIENT
Start: 2023-07-25

## 2023-07-25 RX ORDER — DIPHENHYDRAMINE HYDROCHLORIDE 50 MG/ML
50 INJECTION INTRAMUSCULAR; INTRAVENOUS
OUTPATIENT
Start: 2023-07-25

## 2023-07-25 RX ORDER — SODIUM CHLORIDE 9 MG/ML
INJECTION, SOLUTION INTRAVENOUS CONTINUOUS
OUTPATIENT
Start: 2023-07-25

## 2023-07-25 RX ORDER — ONDANSETRON 2 MG/ML
8 INJECTION INTRAMUSCULAR; INTRAVENOUS
OUTPATIENT
Start: 2023-07-25

## 2023-07-25 RX ADMIN — SODIUM CHLORIDE 20 ML/HR: 9 INJECTION, SOLUTION INTRAVENOUS at 09:58

## 2023-07-25 RX ADMIN — IRON SUCROSE 300 MG: 20 INJECTION, SOLUTION INTRAVENOUS at 09:58

## 2023-07-25 NOTE — PROGRESS NOTES
Pt here for Venofer infusion. PIV placed with blood return noted. Pt has no concerns or questions for physician at this time.     Infusion completed and pt observed for 30 minutes after and tolerated  without incident left ambulatory discharge instructions given

## 2023-10-20 ENCOUNTER — HOSPITAL ENCOUNTER (OUTPATIENT)
Dept: INFUSION THERAPY | Age: 78
Discharge: HOME | End: 2023-10-20
Payer: MEDICARE

## 2023-10-20 DIAGNOSIS — D69.6 THROMBOCYTOPENIA, UNSPECIFIED (HCC): ICD-10-CM

## 2023-10-20 DIAGNOSIS — K74.60 CIRRHOSIS OF LIVER WITHOUT ASCITES, UNSPECIFIED HEPATIC CIRRHOSIS TYPE (HCC): ICD-10-CM

## 2023-10-20 DIAGNOSIS — D50.8 OTHER IRON DEFICIENCY ANEMIAS: ICD-10-CM

## 2023-10-20 DIAGNOSIS — K90.9 INTESTINAL MALABSORPTION, UNSPECIFIED TYPE: ICD-10-CM

## 2023-10-20 DIAGNOSIS — Z12.31 OTHER SCREENING MAMMOGRAM: ICD-10-CM

## 2023-10-20 LAB
ALBUMIN SERPL-MCNC: 4.5 GM/DL (ref 3.4–5)
ALP BLD-CCNC: 39 IU/L (ref 40–129)
ALT SERPL-CCNC: 12 U/L (ref 10–40)
ANION GAP SERPL CALCULATED.3IONS-SCNC: 15 MMOL/L (ref 4–16)
AST SERPL-CCNC: 21 IU/L (ref 15–37)
BASOPHILS ABSOLUTE: 0 K/CU MM
BASOPHILS RELATIVE PERCENT: 0.5 % (ref 0–1)
BILIRUB SERPL-MCNC: 0.4 MG/DL (ref 0–1)
BUN SERPL-MCNC: 19 MG/DL (ref 6–23)
CALCIUM SERPL-MCNC: 10.1 MG/DL (ref 8.3–10.6)
CHLORIDE BLD-SCNC: 101 MMOL/L (ref 99–110)
CO2: 25 MMOL/L (ref 21–32)
CREAT SERPL-MCNC: 0.7 MG/DL (ref 0.6–1.1)
DIFFERENTIAL TYPE: ABNORMAL
EOSINOPHILS ABSOLUTE: 0.2 K/CU MM
EOSINOPHILS RELATIVE PERCENT: 2.2 % (ref 0–3)
FERRITIN: 251 NG/ML (ref 15–150)
FOLATE SERPL-MCNC: >20 NG/ML (ref 3.1–17.5)
GFR SERPL CREATININE-BSD FRML MDRD: >60 ML/MIN/1.73M2
GLUCOSE SERPL-MCNC: 167 MG/DL (ref 70–99)
HCT VFR BLD CALC: 39.9 % (ref 37–47)
HEMOGLOBIN: 13.1 GM/DL (ref 12.5–16)
IRON: 81 UG/DL (ref 37–145)
LYMPHOCYTES ABSOLUTE: 1.7 K/CU MM
LYMPHOCYTES RELATIVE PERCENT: 20.6 % (ref 24–44)
MCH RBC QN AUTO: 30.6 PG (ref 27–31)
MCHC RBC AUTO-ENTMCNC: 32.8 % (ref 32–36)
MCV RBC AUTO: 93.2 FL (ref 78–100)
MONOCYTES ABSOLUTE: 1 K/CU MM
MONOCYTES RELATIVE PERCENT: 12 % (ref 0–4)
PCT TRANSFERRIN: 22 % (ref 10–44)
PDW BLD-RTO: 14.2 % (ref 11.7–14.9)
PLATELET # BLD: 158 K/CU MM (ref 140–440)
PMV BLD AUTO: 11.2 FL (ref 7.5–11.1)
POTASSIUM SERPL-SCNC: 3.4 MMOL/L (ref 3.5–5.1)
RBC # BLD: 4.28 M/CU MM (ref 4.2–5.4)
SEGMENTED NEUTROPHILS ABSOLUTE COUNT: 5.3 K/CU MM
SEGMENTED NEUTROPHILS RELATIVE PERCENT: 64.7 % (ref 36–66)
SODIUM BLD-SCNC: 141 MMOL/L (ref 135–145)
TOTAL IRON BINDING CAPACITY: 372 UG/DL (ref 250–450)
TOTAL PROTEIN: 7.2 GM/DL (ref 6.4–8.2)
UNSATURATED IRON BINDING CAPACITY: 291 UG/DL (ref 110–370)
VITAMIN B-12: 473.9 PG/ML (ref 211–911)
WBC # BLD: 8.1 K/CU MM (ref 4–10.5)

## 2023-10-20 PROCEDURE — 36415 COLL VENOUS BLD VENIPUNCTURE: CPT

## 2023-10-20 PROCEDURE — 82607 VITAMIN B-12: CPT

## 2023-10-20 PROCEDURE — 82728 ASSAY OF FERRITIN: CPT

## 2023-10-20 PROCEDURE — 83540 ASSAY OF IRON: CPT

## 2023-10-20 PROCEDURE — 80053 COMPREHEN METABOLIC PANEL: CPT

## 2023-10-20 PROCEDURE — 83550 IRON BINDING TEST: CPT

## 2023-10-20 PROCEDURE — 85025 COMPLETE CBC W/AUTO DIFF WBC: CPT

## 2023-10-20 PROCEDURE — 82746 ASSAY OF FOLIC ACID SERUM: CPT

## 2023-10-27 ENCOUNTER — HOSPITAL ENCOUNTER (OUTPATIENT)
Dept: INFUSION THERAPY | Age: 78
Discharge: HOME | End: 2023-10-27
Payer: MEDICARE

## 2023-10-27 ENCOUNTER — OFFICE VISIT (OUTPATIENT)
Dept: ONCOLOGY | Age: 78
End: 2023-10-27
Payer: MEDICARE

## 2023-10-27 VITALS
WEIGHT: 145.2 LBS | HEART RATE: 80 BPM | SYSTOLIC BLOOD PRESSURE: 185 MMHG | HEIGHT: 62 IN | OXYGEN SATURATION: 95 % | RESPIRATION RATE: 18 BRPM | DIASTOLIC BLOOD PRESSURE: 85 MMHG | TEMPERATURE: 97.4 F | BODY MASS INDEX: 26.72 KG/M2

## 2023-10-27 DIAGNOSIS — Z12.31 OTHER SCREENING MAMMOGRAM: ICD-10-CM

## 2023-10-27 DIAGNOSIS — D50.8 OTHER IRON DEFICIENCY ANEMIAS: ICD-10-CM

## 2023-10-27 DIAGNOSIS — D69.6 THROMBOCYTOPENIA, UNSPECIFIED (HCC): Primary | ICD-10-CM

## 2023-10-27 DIAGNOSIS — K74.60 CIRRHOSIS OF LIVER WITHOUT ASCITES, UNSPECIFIED HEPATIC CIRRHOSIS TYPE (HCC): ICD-10-CM

## 2023-10-27 DIAGNOSIS — K90.9 INTESTINAL MALABSORPTION, UNSPECIFIED TYPE: ICD-10-CM

## 2023-10-27 PROCEDURE — 99213 OFFICE O/P EST LOW 20 MIN: CPT | Performed by: INTERNAL MEDICINE

## 2023-10-27 PROCEDURE — 99211 OFF/OP EST MAY X REQ PHY/QHP: CPT

## 2023-10-27 PROCEDURE — 3078F DIAST BP <80 MM HG: CPT | Performed by: INTERNAL MEDICINE

## 2023-10-27 PROCEDURE — 3074F SYST BP LT 130 MM HG: CPT | Performed by: INTERNAL MEDICINE

## 2023-10-27 PROCEDURE — 1123F ACP DISCUSS/DSCN MKR DOCD: CPT | Performed by: INTERNAL MEDICINE

## 2023-10-27 NOTE — PROGRESS NOTES
Patient Name: Chris Steel  Patient : 1945  Patient MRN: [id number]     Primary Oncologist: Elías Chowdary MD  Referring Physician: Zac Chang MD   GI: Dr Jocelyne Logan       Date of Service: 10/27/2023      Chief Complaint:   Chief Complaint   Patient presents with    Follow-up        Active Problem list  1. Thrombocytopenia, unspecified ([address])    2. Other iron deficiency anemias    3. Intestinal malabsorption, unspecified type    4. Cirrhosis of liver without ascites, unspecified hepatic cirrhosis type ([address])    5. Other screening mammogram           HPI:        Initial HPI: 2017:  81 YOWF arrived alone. Reported increased fatigue for the past year. Reported that she falls asleep easily even at work. Friday is the last day of work. Denied any overt bleeding. Denied any chest pain, sob. Denied any PICA sx. Reported RUQ yesterday all day, was told that she had /gall bladder disease. Reported that she has been placed on oral iron supplements may 2017 but reports diarrhea associated with oral iron. Denied any fever, drenching night sweats or any weight loss. Reported dark stools when she takes tab. BP at home 190/80. Denies any Gu sx  ferritin 13, sats 10%    2017:Received parental iron and placed on oral iron supplementation. 17:Today in the clinic she arrived alone  -she reports that she feels more energetic after the iron infusion   -reports that she continues to have rt sided abdominal pain  -reports weight loss of about 4 lbs in the past month or so  -denies any overt bleeding  -denies any chest pain, sob  -reports uri sx  -denies any fever    3/22/18:Arrived alone to the clinic. Reported that her energy levels are much better after the iron infusion. Was sick with bronchitis  and 2018. Denied any fever, night sweats. Reported 24 lb weight loss as she walking more and also watching her diet. Has gained 4 lbs.  Reported nose bleed when she had the sinus infection but no

## 2023-10-27 NOTE — PROGRESS NOTES
MA Rooming Questions  Patient: Dennis Nguyễn  MRN: [id number]    Date: 10/27/2023        1. Do you have any new issues? yes - sciatic nerve pain         2. Do you need any refills on medications?    no    3. Have you had any imaging done since your last visit?   no    4. Have you been hospitalized or seen in the emergency room since your last visit here?   no    5. Did the patient have a depression screening completed today?  No    No data recorded     PHQ-9 Given to (if applicable):               PHQ-9 Score (if applicable):                     [] Positive     []  Negative              Does question #9 need addressed (if applicable)                     [] Yes    []  No               Fernando Bazan MA

## 2024-02-20 ENCOUNTER — HOSPITAL ENCOUNTER (OUTPATIENT)
Dept: INFUSION THERAPY | Age: 79
Discharge: HOME | End: 2024-02-20
Payer: MEDICARE

## 2024-02-20 DIAGNOSIS — K90.9 INTESTINAL MALABSORPTION, UNSPECIFIED TYPE: ICD-10-CM

## 2024-02-20 DIAGNOSIS — D50.8 OTHER IRON DEFICIENCY ANEMIAS: ICD-10-CM

## 2024-02-20 DIAGNOSIS — K74.60 CIRRHOSIS OF LIVER WITHOUT ASCITES, UNSPECIFIED HEPATIC CIRRHOSIS TYPE (HCC): ICD-10-CM

## 2024-02-20 DIAGNOSIS — Z12.31 OTHER SCREENING MAMMOGRAM: ICD-10-CM

## 2024-02-20 DIAGNOSIS — D69.6 THROMBOCYTOPENIA, UNSPECIFIED (HCC): ICD-10-CM

## 2024-02-20 LAB
ALBUMIN SERPL-MCNC: 4.9 GM/DL (ref 3.4–5)
ALP BLD-CCNC: 52 IU/L (ref 40–129)
ALT SERPL-CCNC: 20 U/L (ref 10–40)
ANION GAP SERPL CALCULATED.3IONS-SCNC: 13 MMOL/L (ref 7–16)
AST SERPL-CCNC: 28 IU/L (ref 15–37)
BASOPHILS ABSOLUTE: 0.1 K/CU MM
BASOPHILS RELATIVE PERCENT: 0.8 % (ref 0–1)
BILIRUB SERPL-MCNC: 0.5 MG/DL (ref 0–1)
BUN SERPL-MCNC: 14 MG/DL (ref 6–23)
CALCIUM SERPL-MCNC: 11 MG/DL (ref 8.3–10.6)
CHLORIDE BLD-SCNC: 98 MMOL/L (ref 99–110)
CO2: 29 MMOL/L (ref 21–32)
CREAT SERPL-MCNC: 0.5 MG/DL (ref 0.6–1.1)
DIFFERENTIAL TYPE: ABNORMAL
EOSINOPHILS ABSOLUTE: 0.2 K/CU MM
EOSINOPHILS RELATIVE PERCENT: 2 % (ref 0–3)
FERRITIN: 147 NG/ML (ref 15–150)
FOLATE SERPL-MCNC: >20 NG/ML (ref 3.1–17.5)
GFR SERPL CREATININE-BSD FRML MDRD: >60 ML/MIN/1.73M2
GLUCOSE SERPL-MCNC: 106 MG/DL (ref 70–99)
HCT VFR BLD CALC: 43.9 % (ref 37–47)
HEMOGLOBIN: 14.7 GM/DL (ref 12.5–16)
IRON: 86 UG/DL (ref 37–145)
LYMPHOCYTES ABSOLUTE: 2.5 K/CU MM
LYMPHOCYTES RELATIVE PERCENT: 22.6 % (ref 24–44)
MCH RBC QN AUTO: 31.1 PG (ref 27–31)
MCHC RBC AUTO-ENTMCNC: 33.5 % (ref 32–36)
MCV RBC AUTO: 93 FL (ref 78–100)
MONOCYTES ABSOLUTE: 1.4 K/CU MM
MONOCYTES RELATIVE PERCENT: 12.1 % (ref 0–4)
PCT TRANSFERRIN: 23 % (ref 10–44)
PDW BLD-RTO: 13.9 % (ref 11.7–14.9)
PLATELET # BLD: 138 K/CU MM (ref 140–440)
PMV BLD AUTO: 10.4 FL (ref 7.5–11.1)
POTASSIUM SERPL-SCNC: 3.5 MMOL/L (ref 3.5–5.1)
RBC # BLD: 4.72 M/CU MM (ref 4.2–5.4)
SEGMENTED NEUTROPHILS ABSOLUTE COUNT: 7 K/CU MM
SEGMENTED NEUTROPHILS RELATIVE PERCENT: 62.5 % (ref 36–66)
SODIUM BLD-SCNC: 140 MMOL/L (ref 135–145)
TOTAL IRON BINDING CAPACITY: 368 UG/DL (ref 250–450)
TOTAL PROTEIN: 7.5 GM/DL (ref 6.4–8.2)
UNSATURATED IRON BINDING CAPACITY: 282 UG/DL (ref 110–370)
VITAMIN B-12: 561.8 PG/ML (ref 211–911)
WBC # BLD: 11.2 K/CU MM (ref 4–10.5)

## 2024-02-20 PROCEDURE — 85025 COMPLETE CBC W/AUTO DIFF WBC: CPT

## 2024-02-20 PROCEDURE — 83540 ASSAY OF IRON: CPT

## 2024-02-20 PROCEDURE — 80053 COMPREHEN METABOLIC PANEL: CPT

## 2024-02-20 PROCEDURE — 82746 ASSAY OF FOLIC ACID SERUM: CPT

## 2024-02-20 PROCEDURE — 36415 COLL VENOUS BLD VENIPUNCTURE: CPT

## 2024-02-20 PROCEDURE — 82607 VITAMIN B-12: CPT

## 2024-02-20 PROCEDURE — 82728 ASSAY OF FERRITIN: CPT

## 2024-02-20 PROCEDURE — 83550 IRON BINDING TEST: CPT

## 2024-02-26 ENCOUNTER — HOSPITAL ENCOUNTER (OUTPATIENT)
Dept: INFUSION THERAPY | Age: 79
Discharge: HOME | End: 2024-02-26
Payer: MEDICARE

## 2024-02-26 ENCOUNTER — OFFICE VISIT (OUTPATIENT)
Dept: ONCOLOGY | Age: 79
End: 2024-02-26
Payer: MEDICARE

## 2024-02-26 VITALS
HEIGHT: 62 IN | SYSTOLIC BLOOD PRESSURE: 180 MMHG | WEIGHT: 148.4 LBS | TEMPERATURE: 97.7 F | HEART RATE: 84 BPM | DIASTOLIC BLOOD PRESSURE: 76 MMHG | OXYGEN SATURATION: 93 % | BODY MASS INDEX: 27.31 KG/M2

## 2024-02-26 DIAGNOSIS — D50.8 OTHER IRON DEFICIENCY ANEMIAS: ICD-10-CM

## 2024-02-26 DIAGNOSIS — K74.60 CIRRHOSIS OF LIVER WITHOUT ASCITES, UNSPECIFIED HEPATIC CIRRHOSIS TYPE (HCC): ICD-10-CM

## 2024-02-26 DIAGNOSIS — D69.6 THROMBOCYTOPENIA, UNSPECIFIED (HCC): Primary | ICD-10-CM

## 2024-02-26 DIAGNOSIS — K90.9 INTESTINAL MALABSORPTION, UNSPECIFIED TYPE: ICD-10-CM

## 2024-02-26 PROCEDURE — 1123F ACP DISCUSS/DSCN MKR DOCD: CPT | Performed by: INTERNAL MEDICINE

## 2024-02-26 PROCEDURE — 3077F SYST BP >= 140 MM HG: CPT | Performed by: INTERNAL MEDICINE

## 2024-02-26 PROCEDURE — 99211 OFF/OP EST MAY X REQ PHY/QHP: CPT

## 2024-02-26 PROCEDURE — 3078F DIAST BP <80 MM HG: CPT | Performed by: INTERNAL MEDICINE

## 2024-02-26 PROCEDURE — 99214 OFFICE O/P EST MOD 30 MIN: CPT | Performed by: INTERNAL MEDICINE

## 2024-02-26 ASSESSMENT — PATIENT HEALTH QUESTIONNAIRE - PHQ9
2. FEELING DOWN, DEPRESSED OR HOPELESS: 0
1. LITTLE INTEREST OR PLEASURE IN DOING THINGS: 0
SUM OF ALL RESPONSES TO PHQ QUESTIONS 1-9: 0
SUM OF ALL RESPONSES TO PHQ QUESTIONS 1-9: 0
SUM OF ALL RESPONSES TO PHQ9 QUESTIONS 1 & 2: 0
SUM OF ALL RESPONSES TO PHQ QUESTIONS 1-9: 0
SUM OF ALL RESPONSES TO PHQ QUESTIONS 1-9: 0

## 2024-02-26 NOTE — PROGRESS NOTES
MA Rooming Questions  Patient: Aspen Henley  MRN: [id number]    Date: 2/26/2024        1. Do you have any new issues?   no         2. Do you need any refills on medications?    no    3. Have you had any imaging done since your last visit?   no    4. Have you been hospitalized or seen in the emergency room since your last visit here?   no    5. Did the patient have a depression screening completed today? Yes    PHQ-9 Total Score: 0 (2/26/2024  3:22 PM)       PHQ-9 Given to (if applicable):               PHQ-9 Score (if applicable):                     [] Positive     [x]  Negative              Does question #9 need addressed (if applicable)                     [] Yes    []  No               Kanika Molina MA    
but no other overt bleeding. Reported eczematous rash. Denied any chest pain, increased sob, cough, palpitation. Reported that she continues to have RUQ pain, has follow up appt with GI.     C-scope may 2015 with internal hemmorrhoids and diverticulosis    C scope 4/30/2018 with tubular adenoma of the sigmoid colon tubular adenoma of the transverse colon and diverticulosis of the sigmoid colon and descending colon. Hemorrhoids grade 2.     EGD 6/25/2018 with hiatal hernia, dilated gastric stenosis, nonbleeding gastric ulcer, chronic gastritis.    11/13/18: Ct chest, abdomen and pelvis:IMPRESSION: No findings suspicious for malignancy within the chest, abdomen or pelvis. Hepatosplenomegaly with nodular liver contour and hepatic steatosis. Additional features suggesting some degree of portal venous hypertension. No abdominal ascites.    9/2019: Received parental iron    9/2019 CT scan of the chest showed small mediastinal lymph nodes. No lung nodules. Old granulomatous disease    2/17/2020 iron saturation of 20% folate of more than 20 ferritin 283 CMP with the BUN of 16 creatinine 0.7 albumin of 4.3 B12 688 CBC with WBC of 9.6 hemoglobin of 13.6 hematocrit 40.3 MCV of 93.5 and platelets 148    9/2/2021:NST:  Summary    No EKG changes suggestive of ischemia with Lexiscan infusion.    Normal perfusion uptake noted    no reversible ischemia noted    gating shows EF 71%     July 2022, mammogram with no evidence of any malignancy, BI-RADS Category 2    July 2022: US abdomen:  1. Mild diffuse hepatic steatosis.  Mild hepatomegaly.   2. Normal gallbladder.  Normal common bile duct at 5.5 mm.   3. Normal right kidney and pancreas.   4. No upper abdominal ascites.         2/27/2023 iron saturations of 9%Ferritin of 22 CMP with sodium of 134 potassium of 3.9 BUN of 18 creatinine 0.6 glucose 194 otherwise within normal limits CBC with hemoglobin of 12 hematocrit 38 WBC of 8.6 MCV of 85.6 and platelets of 143    June 21 2023: CBC

## 2024-09-06 ENCOUNTER — HOSPITAL ENCOUNTER (OUTPATIENT)
Dept: INFUSION THERAPY | Age: 79
Discharge: HOME | End: 2024-09-06
Payer: MEDICARE

## 2024-09-06 DIAGNOSIS — D50.8 OTHER IRON DEFICIENCY ANEMIAS: ICD-10-CM

## 2024-09-06 DIAGNOSIS — D69.6 THROMBOCYTOPENIA, UNSPECIFIED (HCC): ICD-10-CM

## 2024-09-06 DIAGNOSIS — K90.9 INTESTINAL MALABSORPTION, UNSPECIFIED TYPE: ICD-10-CM

## 2024-09-06 DIAGNOSIS — K74.60 CIRRHOSIS OF LIVER WITHOUT ASCITES, UNSPECIFIED HEPATIC CIRRHOSIS TYPE (HCC): ICD-10-CM

## 2024-09-06 LAB
ALBUMIN SERPL-MCNC: 4.4 GM/DL (ref 3.4–5)
ALP BLD-CCNC: 41 IU/L (ref 40–129)
ALT SERPL-CCNC: 20 U/L (ref 10–40)
ANION GAP SERPL CALCULATED.3IONS-SCNC: 11 MMOL/L (ref 7–16)
AST SERPL-CCNC: 28 IU/L (ref 15–37)
BASOPHILS ABSOLUTE: 0.1 K/CU MM
BASOPHILS RELATIVE PERCENT: 0.7 % (ref 0–1)
BILIRUB SERPL-MCNC: 0.2 MG/DL (ref 0–1)
BUN SERPL-MCNC: 21 MG/DL (ref 6–23)
CALCIUM SERPL-MCNC: 10.1 MG/DL (ref 8.3–10.6)
CHLORIDE BLD-SCNC: 96 MMOL/L (ref 99–110)
CO2: 27 MMOL/L (ref 21–32)
CREAT SERPL-MCNC: 0.7 MG/DL (ref 0.6–1.1)
DIFFERENTIAL TYPE: ABNORMAL
EOSINOPHILS ABSOLUTE: 0.2 K/CU MM
EOSINOPHILS RELATIVE PERCENT: 3.5 % (ref 0–3)
FERRITIN: 73 NG/ML (ref 15–150)
FOLATE SERPL-MCNC: >20 NG/ML (ref 3.1–17.5)
GFR, ESTIMATED: 89 ML/MIN/1.73M2
GLUCOSE SERPL-MCNC: 220 MG/DL (ref 70–99)
HCT VFR BLD CALC: 40 % (ref 37–47)
HEMOGLOBIN: 12.9 GM/DL (ref 12.5–16)
IRON: 53 UG/DL (ref 37–145)
LYMPHOCYTES ABSOLUTE: 1.7 K/CU MM
LYMPHOCYTES RELATIVE PERCENT: 25.1 % (ref 24–44)
MCH RBC QN AUTO: 30.7 PG (ref 27–31)
MCHC RBC AUTO-ENTMCNC: 32.3 % (ref 32–36)
MCV RBC AUTO: 95.2 FL (ref 78–100)
MONOCYTES ABSOLUTE: 0.9 K/CU MM
MONOCYTES RELATIVE PERCENT: 13.1 % (ref 0–4)
NEUTROPHILS ABSOLUTE: 3.9 K/CU MM
NEUTROPHILS RELATIVE PERCENT: 57.6 % (ref 36–66)
PCT TRANSFERRIN: 13 % (ref 10–44)
PDW BLD-RTO: 12.9 % (ref 11.7–14.9)
PLATELET # BLD: 148 K/CU MM (ref 140–440)
PMV BLD AUTO: 10.5 FL (ref 7.5–11.1)
POTASSIUM SERPL-SCNC: 3.7 MMOL/L (ref 3.5–5.1)
RBC # BLD: 4.2 M/CU MM (ref 4.2–5.4)
SODIUM BLD-SCNC: 134 MMOL/L (ref 135–145)
TOTAL IRON BINDING CAPACITY: 405 UG/DL (ref 250–450)
TOTAL PROTEIN: 6.9 GM/DL (ref 6.4–8.2)
UNSATURATED IRON BINDING CAPACITY: 352 UG/DL (ref 110–370)
VITAMIN B-12: 844.1 PG/ML (ref 211–911)
WBC # BLD: 6.8 K/CU MM (ref 4–10.5)

## 2024-09-06 PROCEDURE — 80053 COMPREHEN METABOLIC PANEL: CPT

## 2024-09-06 PROCEDURE — 82728 ASSAY OF FERRITIN: CPT

## 2024-09-06 PROCEDURE — 36415 COLL VENOUS BLD VENIPUNCTURE: CPT

## 2024-09-06 PROCEDURE — 82746 ASSAY OF FOLIC ACID SERUM: CPT

## 2024-09-06 PROCEDURE — 83550 IRON BINDING TEST: CPT

## 2024-09-06 PROCEDURE — 82607 VITAMIN B-12: CPT

## 2024-09-06 PROCEDURE — 85025 COMPLETE CBC W/AUTO DIFF WBC: CPT

## 2024-09-06 PROCEDURE — 83540 ASSAY OF IRON: CPT

## 2024-09-12 ENCOUNTER — HOSPITAL ENCOUNTER (OUTPATIENT)
Dept: INFUSION THERAPY | Age: 79
Discharge: HOME | End: 2024-09-12
Payer: MEDICARE

## 2024-09-12 ENCOUNTER — OFFICE VISIT (OUTPATIENT)
Dept: ONCOLOGY | Age: 79
End: 2024-09-12

## 2024-09-12 VITALS
HEIGHT: 62 IN | HEART RATE: 77 BPM | DIASTOLIC BLOOD PRESSURE: 68 MMHG | SYSTOLIC BLOOD PRESSURE: 155 MMHG | TEMPERATURE: 97.5 F | RESPIRATION RATE: 16 BRPM | BODY MASS INDEX: 28.26 KG/M2 | WEIGHT: 153.6 LBS | OXYGEN SATURATION: 95 %

## 2024-09-12 DIAGNOSIS — D50.8 OTHER IRON DEFICIENCY ANEMIAS: ICD-10-CM

## 2024-09-12 DIAGNOSIS — D69.6 THROMBOCYTOPENIA, UNSPECIFIED (HCC): Primary | ICD-10-CM

## 2024-09-12 DIAGNOSIS — K74.60 CIRRHOSIS OF LIVER WITHOUT ASCITES, UNSPECIFIED HEPATIC CIRRHOSIS TYPE (HCC): ICD-10-CM

## 2024-09-12 DIAGNOSIS — Z12.31 OTHER SCREENING MAMMOGRAM: ICD-10-CM

## 2024-09-12 DIAGNOSIS — R59.0 LAD (LYMPHADENOPATHY) OF LEFT CERVICAL REGION: ICD-10-CM

## 2024-09-12 DIAGNOSIS — R22.0 NODULE OF TONGUE: ICD-10-CM

## 2024-09-12 DIAGNOSIS — K90.9 INTESTINAL MALABSORPTION, UNSPECIFIED TYPE: ICD-10-CM

## 2024-09-12 PROCEDURE — 99211 OFF/OP EST MAY X REQ PHY/QHP: CPT

## 2024-09-20 ENCOUNTER — TELEPHONE (OUTPATIENT)
Dept: ONCOLOGY | Age: 79
End: 2024-09-20

## 2024-10-09 ENCOUNTER — HOSPITAL ENCOUNTER (OUTPATIENT)
Dept: CT IMAGING | Age: 79
Discharge: HOME | End: 2024-10-09
Attending: INTERNAL MEDICINE
Payer: MEDICARE

## 2024-10-09 DIAGNOSIS — R59.0 LAD (LYMPHADENOPATHY) OF LEFT CERVICAL REGION: ICD-10-CM

## 2024-10-09 PROCEDURE — 6360000004 HC RX CONTRAST MEDICATION: Performed by: INTERNAL MEDICINE

## 2024-10-09 PROCEDURE — 70491 CT SOFT TISSUE NECK W/DYE: CPT

## 2024-10-09 PROCEDURE — 2580000003 HC RX 258: Performed by: INTERNAL MEDICINE

## 2024-10-09 RX ORDER — IOPAMIDOL 755 MG/ML
75 INJECTION, SOLUTION INTRAVASCULAR
Status: COMPLETED | OUTPATIENT
Start: 2024-10-09 | End: 2024-10-09

## 2024-10-09 RX ORDER — SODIUM CHLORIDE 9 MG/ML
10 INJECTION, SOLUTION INTRAMUSCULAR; INTRAVENOUS; SUBCUTANEOUS PRN
Status: DISCONTINUED | OUTPATIENT
Start: 2024-10-09 | End: 2024-10-10 | Stop reason: HOSPADM

## 2024-10-09 RX ADMIN — IOPAMIDOL 75 ML: 755 INJECTION, SOLUTION INTRAVENOUS at 13:16

## 2024-10-09 RX ADMIN — SODIUM CHLORIDE, PRESERVATIVE FREE 10 ML: 5 INJECTION INTRAVENOUS at 13:16

## 2024-10-14 ENCOUNTER — CLINICAL DOCUMENTATION (OUTPATIENT)
Dept: ONCOLOGY | Age: 79
End: 2024-10-14

## 2024-10-14 NOTE — PROGRESS NOTES
CT neck soft tissue faxed to the office of Dr Reinier Starkey @ [phone] per Dr Newman's request.

## 2025-01-16 ENCOUNTER — HOSPITAL ENCOUNTER (OUTPATIENT)
Dept: INFUSION THERAPY | Age: 80
Discharge: HOME | End: 2025-01-16
Payer: MEDICARE

## 2025-01-16 DIAGNOSIS — K90.9 INTESTINAL MALABSORPTION, UNSPECIFIED TYPE: ICD-10-CM

## 2025-01-16 DIAGNOSIS — K74.60 CIRRHOSIS OF LIVER WITHOUT ASCITES, UNSPECIFIED HEPATIC CIRRHOSIS TYPE (HCC): ICD-10-CM

## 2025-01-16 DIAGNOSIS — Z12.31 OTHER SCREENING MAMMOGRAM: ICD-10-CM

## 2025-01-16 DIAGNOSIS — D69.6 THROMBOCYTOPENIA, UNSPECIFIED (HCC): ICD-10-CM

## 2025-01-16 DIAGNOSIS — D50.8 OTHER IRON DEFICIENCY ANEMIAS: ICD-10-CM

## 2025-01-16 DIAGNOSIS — R22.0 NODULE OF TONGUE: ICD-10-CM

## 2025-01-16 LAB
ALBUMIN SERPL-MCNC: 4.2 G/DL (ref 3.4–5)
ALBUMIN/GLOB SERPL: 1.8 {RATIO} (ref 1.1–2.2)
ALP SERPL-CCNC: 38 U/L (ref 40–129)
ALT SERPL-CCNC: 20 U/L (ref 10–40)
ANION GAP SERPL CALCULATED.3IONS-SCNC: 13 MMOL/L (ref 9–17)
AST SERPL-CCNC: 30 U/L (ref 15–37)
BASOPHILS # BLD: 0.04 K/UL
BASOPHILS NFR BLD: 1 % (ref 0–1)
BILIRUB SERPL-MCNC: 0.3 MG/DL (ref 0–1)
BUN SERPL-MCNC: 18 MG/DL (ref 7–20)
CALCIUM SERPL-MCNC: 10.2 MG/DL (ref 8.3–10.6)
CHLORIDE SERPL-SCNC: 100 MMOL/L (ref 99–110)
CO2 SERPL-SCNC: 25 MMOL/L (ref 21–32)
CREAT SERPL-MCNC: 0.7 MG/DL (ref 0.6–1.2)
EOSINOPHIL # BLD: 0.23 K/UL
EOSINOPHILS RELATIVE PERCENT: 3 % (ref 0–3)
ERYTHROCYTE [DISTWIDTH] IN BLOOD BY AUTOMATED COUNT: 13.9 % (ref 11.7–14.9)
FERRITIN SERPL-MCNC: 44 NG/ML (ref 15–150)
FOLATE SERPL-MCNC: 16.6 NG/ML (ref 4.8–24.2)
GFR, ESTIMATED: 78 ML/MIN/1.73M2
GLUCOSE SERPL-MCNC: 216 MG/DL (ref 74–99)
HCT VFR BLD AUTO: 39 % (ref 37–47)
HGB BLD-MCNC: 12.5 G/DL (ref 12.5–16)
IRON SATN MFR SERPL: 18 % (ref 15–50)
IRON SERPL-MCNC: 70 UG/DL (ref 37–145)
LYMPHOCYTES NFR BLD: 1.51 K/UL
LYMPHOCYTES RELATIVE PERCENT: 22 % (ref 24–44)
MCH RBC QN AUTO: 29.8 PG (ref 27–31)
MCHC RBC AUTO-ENTMCNC: 32.1 G/DL (ref 32–36)
MCV RBC AUTO: 93.1 FL (ref 78–100)
MONOCYTES NFR BLD: 0.72 K/UL
MONOCYTES NFR BLD: 11 % (ref 0–4)
NEUTROPHILS NFR BLD: 63 % (ref 36–66)
NEUTS SEG NFR BLD: 4.33 K/UL
PLATELET # BLD AUTO: 129 K/UL (ref 140–440)
PMV BLD AUTO: 10.9 FL (ref 7.5–11.1)
POTASSIUM SERPL-SCNC: 4 MMOL/L (ref 3.5–5.1)
PROT SERPL-MCNC: 6.5 G/DL (ref 6.4–8.2)
RBC # BLD AUTO: 4.19 M/UL (ref 4.2–5.4)
SODIUM SERPL-SCNC: 138 MMOL/L (ref 136–145)
TIBC SERPL-MCNC: 395 UG/DL (ref 260–445)
UNSATURATED IRON BINDING CAPACITY: 325 UG/DL (ref 110–370)
VIT B12 SERPL-MCNC: 819 PG/ML (ref 211–911)
WBC OTHER # BLD: 6.8 K/UL (ref 4–10.5)

## 2025-01-16 PROCEDURE — 36415 COLL VENOUS BLD VENIPUNCTURE: CPT

## 2025-01-16 PROCEDURE — 82746 ASSAY OF FOLIC ACID SERUM: CPT

## 2025-01-16 PROCEDURE — 83550 IRON BINDING TEST: CPT

## 2025-01-16 PROCEDURE — 85025 COMPLETE CBC W/AUTO DIFF WBC: CPT

## 2025-01-16 PROCEDURE — 83540 ASSAY OF IRON: CPT

## 2025-01-16 PROCEDURE — 80053 COMPREHEN METABOLIC PANEL: CPT

## 2025-01-16 PROCEDURE — 82607 VITAMIN B-12: CPT

## 2025-01-16 PROCEDURE — 82728 ASSAY OF FERRITIN: CPT

## 2025-01-29 ENCOUNTER — OFFICE VISIT (OUTPATIENT)
Dept: ONCOLOGY | Age: 80
End: 2025-01-29
Payer: MEDICARE

## 2025-01-29 ENCOUNTER — HOSPITAL ENCOUNTER (OUTPATIENT)
Dept: INFUSION THERAPY | Age: 80
Discharge: HOME | End: 2025-01-29
Payer: MEDICARE

## 2025-01-29 VITALS
HEIGHT: 62 IN | SYSTOLIC BLOOD PRESSURE: 168 MMHG | BODY MASS INDEX: 28.05 KG/M2 | WEIGHT: 152.4 LBS | DIASTOLIC BLOOD PRESSURE: 73 MMHG | TEMPERATURE: 97.6 F | OXYGEN SATURATION: 96 % | HEART RATE: 87 BPM

## 2025-01-29 DIAGNOSIS — K74.60 CIRRHOSIS OF LIVER WITHOUT ASCITES, UNSPECIFIED HEPATIC CIRRHOSIS TYPE (HCC): ICD-10-CM

## 2025-01-29 DIAGNOSIS — D69.6 THROMBOCYTOPENIA, UNSPECIFIED (HCC): Primary | ICD-10-CM

## 2025-01-29 DIAGNOSIS — K90.9 INTESTINAL MALABSORPTION, UNSPECIFIED TYPE: ICD-10-CM

## 2025-01-29 DIAGNOSIS — D50.8 OTHER IRON DEFICIENCY ANEMIAS: ICD-10-CM

## 2025-01-29 PROCEDURE — 99214 OFFICE O/P EST MOD 30 MIN: CPT | Performed by: INTERNAL MEDICINE

## 2025-01-29 PROCEDURE — 3078F DIAST BP <80 MM HG: CPT | Performed by: INTERNAL MEDICINE

## 2025-01-29 PROCEDURE — 3077F SYST BP >= 140 MM HG: CPT | Performed by: INTERNAL MEDICINE

## 2025-01-29 PROCEDURE — 1123F ACP DISCUSS/DSCN MKR DOCD: CPT | Performed by: INTERNAL MEDICINE

## 2025-01-29 PROCEDURE — 1126F AMNT PAIN NOTED NONE PRSNT: CPT | Performed by: INTERNAL MEDICINE

## 2025-01-29 PROCEDURE — 99211 OFF/OP EST MAY X REQ PHY/QHP: CPT

## 2025-01-29 PROCEDURE — 1159F MED LIST DOCD IN RCRD: CPT | Performed by: INTERNAL MEDICINE

## 2025-01-29 ASSESSMENT — PATIENT HEALTH QUESTIONNAIRE - PHQ9
SUM OF ALL RESPONSES TO PHQ QUESTIONS 1-9: 0
SUM OF ALL RESPONSES TO PHQ QUESTIONS 1-9: 0
SUM OF ALL RESPONSES TO PHQ9 QUESTIONS 1 & 2: 0
SUM OF ALL RESPONSES TO PHQ QUESTIONS 1-9: 0
2. FEELING DOWN, DEPRESSED OR HOPELESS: NOT AT ALL
1. LITTLE INTEREST OR PLEASURE IN DOING THINGS: NOT AT ALL
SUM OF ALL RESPONSES TO PHQ QUESTIONS 1-9: 0

## 2025-01-29 NOTE — PROGRESS NOTES
MA Rooming Questions  Patient: Aspen Henley  MRN: [id number]    Date: 1/29/2025        1. Do you have any new issues?   Yes - Every day she took the ferrous sulfate it gave her diarrhea, once she stopped stools went back to normal.       2. Do you need any refills on medications?    no    3. Have you had any imaging done since your last visit?   no    4. Have you been hospitalized or seen in the emergency room since your last visit here?   no    5. Did the patient have a depression screening completed today? Yes    PHQ-9 Total Score: 0 (1/29/2025  2:29 PM)       PHQ-9 Given to (if applicable):               PHQ-9 Score (if applicable):                     [] Positive     []  Negative              Does question #9 need addressed (if applicable)                     [] Yes    []  No               Socorro Hopkins CMA      
indicated.    Mild intermittent  thrombocytopenia: Reviewed meds. No evidence of hemolysis, nutritional def, ctd, RF. Defers evaluation for hepatitis/HIV. Most probably sec to splenic sequestration/PHTN/Cirrhosis.  Note platelet counts have normalized as of October 2023 we will continue to monitor.    February 2024, platelet count of 138.  To notify us if any upcoming surgeries.   September 2024 platelet count remains normal  January 2025 platelet count of 420 9K.  Looks like she is going to have left knee surgery, advised her to call our office to get a CBC prior to that.  Also discussed the role of TPO agents    ELLIS/cirrhosis?:  To follow-up with GI.  Surveillance per guidelines.  aFP normal in January 2025.  Will recommend ultrasound of the liver.    HTN:Recommend compliance with antihypertensives. salt restriction diet control and exercise as tolerated.  Maintain a log and discuss with PCP, may need adjustment of her antihypertensives if remains elevated  Elevated today but reported that she has not been taking the antihypertensive for the past 3 days for allergy testing    ?left breast mass: mammogram July 2022 with fatty tissue, none found after that but she reported that she had mammogram just recently last year and was normal per her.  Recommend that she makes appointment for mammogram 2024.  Mammogram ordered twice not done.  Will order again    Thyroid nodules, TI-RADS category 3 and 4.  Recommend repeat ultrasound of the thyroid in a year, will defer to PCP    Discussed the findings and the plan. Pt verbalizes understanding    Discussed healthy lifestyle including regular exercise and weight loss. Also discussed the importance of being up-to-date with age-appropriate screening tools.  Mammogram June 2022 normal as per the records available.  Recommend that she makes appointment for mammogram  Colonoscopy 2024 normal, repeat recommended in 7 years  Mammogram ordered again.    Recommend follow up with PCP

## 2025-02-04 ENCOUNTER — HOSPITAL ENCOUNTER (OUTPATIENT)
Dept: ULTRASOUND IMAGING | Age: 80
Discharge: HOME | End: 2025-02-04

## 2025-02-04 DIAGNOSIS — K74.60 CIRRHOSIS OF LIVER WITHOUT ASCITES, UNSPECIFIED HEPATIC CIRRHOSIS TYPE (HCC): ICD-10-CM

## 2025-02-10 ENCOUNTER — HOSPITAL ENCOUNTER (OUTPATIENT)
Dept: ULTRASOUND IMAGING | Age: 80
Discharge: HOME | End: 2025-02-10
Attending: INTERNAL MEDICINE
Payer: MEDICARE

## 2025-02-10 PROCEDURE — 76700 US EXAM ABDOM COMPLETE: CPT

## 2025-07-25 ENCOUNTER — HOSPITAL ENCOUNTER (OUTPATIENT)
Dept: INFUSION THERAPY | Age: 80
Discharge: HOME | End: 2025-07-25
Payer: MEDICARE

## 2025-07-25 DIAGNOSIS — K74.60 CIRRHOSIS OF LIVER WITHOUT ASCITES, UNSPECIFIED HEPATIC CIRRHOSIS TYPE (HCC): ICD-10-CM

## 2025-07-25 DIAGNOSIS — D69.6 THROMBOCYTOPENIA, UNSPECIFIED: ICD-10-CM

## 2025-07-25 DIAGNOSIS — K90.9 INTESTINAL MALABSORPTION, UNSPECIFIED TYPE: ICD-10-CM

## 2025-07-25 DIAGNOSIS — D50.8 OTHER IRON DEFICIENCY ANEMIAS: ICD-10-CM

## 2025-07-25 LAB
ALBUMIN SERPL-MCNC: 4.1 G/DL (ref 3.4–5)
ALBUMIN/GLOB SERPL: 1.7 {RATIO} (ref 1.1–2.2)
ALP SERPL-CCNC: 42 U/L (ref 40–129)
ALT SERPL-CCNC: 14 U/L (ref 10–40)
ANION GAP SERPL CALCULATED.3IONS-SCNC: 14 MMOL/L (ref 9–17)
AST SERPL-CCNC: 24 U/L (ref 15–37)
BASOPHILS # BLD: 0.04 K/UL
BASOPHILS NFR BLD: 1 % (ref 0–1)
BILIRUB SERPL-MCNC: 0.4 MG/DL (ref 0–1)
BUN SERPL-MCNC: 16 MG/DL (ref 7–20)
CALCIUM SERPL-MCNC: 10 MG/DL (ref 8.3–10.6)
CHLORIDE SERPL-SCNC: 100 MMOL/L (ref 99–110)
CO2 SERPL-SCNC: 23 MMOL/L (ref 21–32)
CREAT SERPL-MCNC: 0.8 MG/DL (ref 0.6–1.2)
EOSINOPHIL # BLD: 0.22 K/UL
EOSINOPHILS RELATIVE PERCENT: 3 % (ref 0–3)
ERYTHROCYTE [DISTWIDTH] IN BLOOD BY AUTOMATED COUNT: 14.9 % (ref 11.7–14.9)
FERRITIN SERPL-MCNC: 23 NG/ML (ref 15–150)
FOLATE SERPL-MCNC: 26.5 NG/ML (ref 4.8–24.2)
GFR, ESTIMATED: 73 ML/MIN/1.73M2
GLUCOSE SERPL-MCNC: 239 MG/DL (ref 74–99)
HCT VFR BLD AUTO: 37.9 % (ref 37–47)
HGB BLD-MCNC: 12.1 G/DL (ref 12.5–16)
IRON SATN MFR SERPL: 15 % (ref 15–50)
IRON SERPL-MCNC: 63 UG/DL (ref 37–145)
LYMPHOCYTES NFR BLD: 1.67 K/UL
LYMPHOCYTES RELATIVE PERCENT: 23 % (ref 24–44)
MCH RBC QN AUTO: 28.9 PG (ref 27–31)
MCHC RBC AUTO-ENTMCNC: 31.9 G/DL (ref 32–36)
MCV RBC AUTO: 90.5 FL (ref 78–100)
MONOCYTES NFR BLD: 0.7 K/UL
MONOCYTES NFR BLD: 10 % (ref 0–5)
NEUTROPHILS NFR BLD: 64 % (ref 36–66)
NEUTS SEG NFR BLD: 4.59 K/UL
PLATELET # BLD AUTO: 145 K/UL (ref 140–440)
PMV BLD AUTO: 10.9 FL (ref 7.5–11.1)
POTASSIUM SERPL-SCNC: 3.8 MMOL/L (ref 3.5–5.1)
PROT SERPL-MCNC: 6.6 G/DL (ref 6.4–8.2)
RBC # BLD AUTO: 4.19 M/UL (ref 4.2–5.4)
SODIUM SERPL-SCNC: 137 MMOL/L (ref 136–145)
TIBC SERPL-MCNC: 412 UG/DL (ref 260–445)
UNSATURATED IRON BINDING CAPACITY: 349 UG/DL (ref 110–370)
VIT B12 SERPL-MCNC: 998 PG/ML (ref 211–911)
WBC OTHER # BLD: 7.2 K/UL (ref 4–10.5)

## 2025-07-25 PROCEDURE — 36415 COLL VENOUS BLD VENIPUNCTURE: CPT

## 2025-07-25 PROCEDURE — 83550 IRON BINDING TEST: CPT

## 2025-07-25 PROCEDURE — 82728 ASSAY OF FERRITIN: CPT

## 2025-07-25 PROCEDURE — 83540 ASSAY OF IRON: CPT

## 2025-07-25 PROCEDURE — 80053 COMPREHEN METABOLIC PANEL: CPT

## 2025-07-25 PROCEDURE — 85025 COMPLETE CBC W/AUTO DIFF WBC: CPT

## 2025-07-25 PROCEDURE — 82746 ASSAY OF FOLIC ACID SERUM: CPT

## 2025-07-25 PROCEDURE — 82607 VITAMIN B-12: CPT

## 2025-07-29 ENCOUNTER — HOSPITAL ENCOUNTER (OUTPATIENT)
Dept: INFUSION THERAPY | Age: 80
Discharge: HOME | End: 2025-07-29
Payer: MEDICARE

## 2025-07-29 ENCOUNTER — OFFICE VISIT (OUTPATIENT)
Dept: ONCOLOGY | Age: 80
End: 2025-07-29

## 2025-07-29 VITALS
BODY MASS INDEX: 28.05 KG/M2 | DIASTOLIC BLOOD PRESSURE: 67 MMHG | WEIGHT: 152.4 LBS | SYSTOLIC BLOOD PRESSURE: 158 MMHG | RESPIRATION RATE: 16 BRPM | TEMPERATURE: 98.2 F | HEIGHT: 62 IN | OXYGEN SATURATION: 95 % | HEART RATE: 76 BPM

## 2025-07-29 DIAGNOSIS — D50.8 OTHER IRON DEFICIENCY ANEMIAS: ICD-10-CM

## 2025-07-29 DIAGNOSIS — K74.60 CIRRHOSIS OF LIVER WITHOUT ASCITES, UNSPECIFIED HEPATIC CIRRHOSIS TYPE (HCC): ICD-10-CM

## 2025-07-29 DIAGNOSIS — D69.6 THROMBOCYTOPENIA, UNSPECIFIED: Primary | ICD-10-CM

## 2025-07-29 DIAGNOSIS — K90.9 INTESTINAL MALABSORPTION, UNSPECIFIED TYPE: ICD-10-CM

## 2025-07-29 PROCEDURE — 99212 OFFICE O/P EST SF 10 MIN: CPT

## 2025-07-29 RX ORDER — FLUTICASONE PROPIONATE 50 MCG
SPRAY, SUSPENSION (ML) NASAL
COMMUNITY
Start: 2025-07-27

## 2025-07-29 RX ORDER — HYDRALAZINE HYDROCHLORIDE 50 MG/1
TABLET, FILM COATED ORAL
COMMUNITY
Start: 2025-07-29

## 2025-07-29 RX ORDER — OLMESARTAN MEDOXOMIL AND HYDROCHLOROTHIAZIDE 40/25 40; 25 MG/1; MG/1
TABLET ORAL
COMMUNITY
Start: 2025-06-06

## 2025-07-29 RX ORDER — FENOFIBRATE 145 MG/1
TABLET, FILM COATED ORAL
COMMUNITY
Start: 2025-07-27

## 2025-07-29 ASSESSMENT — PATIENT HEALTH QUESTIONNAIRE - PHQ9
2. FEELING DOWN, DEPRESSED OR HOPELESS: NOT AT ALL
SUM OF ALL RESPONSES TO PHQ QUESTIONS 1-9: 0
SUM OF ALL RESPONSES TO PHQ QUESTIONS 1-9: 0
1. LITTLE INTEREST OR PLEASURE IN DOING THINGS: NOT AT ALL
SUM OF ALL RESPONSES TO PHQ QUESTIONS 1-9: 0
SUM OF ALL RESPONSES TO PHQ QUESTIONS 1-9: 0

## 2025-07-31 PROBLEM — D50.0 IRON DEFICIENCY ANEMIA SECONDARY TO BLOOD LOSS (CHRONIC): Status: ACTIVE | Noted: 2023-06-28

## 2025-07-31 RX ORDER — SODIUM CHLORIDE 0.9 % (FLUSH) 0.9 %
5-40 SYRINGE (ML) INJECTION PRN
OUTPATIENT
Start: 2025-07-31

## 2025-07-31 RX ORDER — EPINEPHRINE 1 MG/ML
0.3 INJECTION, SOLUTION, CONCENTRATE INTRAVENOUS PRN
OUTPATIENT
Start: 2025-07-31

## 2025-07-31 RX ORDER — SODIUM CHLORIDE 9 MG/ML
5-250 INJECTION, SOLUTION INTRAVENOUS PRN
OUTPATIENT
Start: 2025-07-31

## 2025-07-31 RX ORDER — HEPARIN SODIUM (PORCINE) LOCK FLUSH IV SOLN 100 UNIT/ML 100 UNIT/ML
500 SOLUTION INTRAVENOUS PRN
OUTPATIENT
Start: 2025-07-31

## 2025-07-31 RX ORDER — ONDANSETRON 2 MG/ML
8 INJECTION INTRAMUSCULAR; INTRAVENOUS
OUTPATIENT
Start: 2025-07-31

## 2025-07-31 RX ORDER — ALBUTEROL SULFATE 90 UG/1
4 INHALANT RESPIRATORY (INHALATION) PRN
OUTPATIENT
Start: 2025-07-31

## 2025-07-31 RX ORDER — MEPERIDINE HYDROCHLORIDE 50 MG/ML
12.5 INJECTION INTRAMUSCULAR; INTRAVENOUS; SUBCUTANEOUS PRN
OUTPATIENT
Start: 2025-07-31

## 2025-07-31 RX ORDER — SODIUM CHLORIDE 9 MG/ML
INJECTION, SOLUTION INTRAVENOUS PRN
OUTPATIENT
Start: 2025-07-31

## 2025-07-31 RX ORDER — DIPHENHYDRAMINE HYDROCHLORIDE 50 MG/ML
50 INJECTION, SOLUTION INTRAMUSCULAR; INTRAVENOUS
OUTPATIENT
Start: 2025-07-31

## 2025-07-31 RX ORDER — FAMOTIDINE 10 MG/ML
20 INJECTION, SOLUTION INTRAVENOUS
OUTPATIENT
Start: 2025-07-31

## 2025-07-31 RX ORDER — ACETAMINOPHEN 325 MG/1
500 TABLET ORAL ONCE
OUTPATIENT
Start: 2025-07-31 | End: 2025-07-31

## 2025-07-31 RX ORDER — HYDROCORTISONE SODIUM SUCCINATE 100 MG/2ML
100 INJECTION INTRAMUSCULAR; INTRAVENOUS
OUTPATIENT
Start: 2025-07-31

## 2025-07-31 RX ORDER — ACETAMINOPHEN 325 MG/1
500 TABLET ORAL
OUTPATIENT
Start: 2025-07-31

## 2025-07-31 NOTE — PROGRESS NOTES
Physician notified patient that she needs iron infusion. Order for infed 1,000 mg x1 and NN referral placed. Therapy plan added. Once medication approved by insurance, patient will be scheduled for infusion and notified of appointment time.

## 2025-08-13 ENCOUNTER — TELEPHONE (OUTPATIENT)
Dept: ONCOLOGY | Age: 80
End: 2025-08-13